# Patient Record
Sex: MALE | Race: WHITE
[De-identification: names, ages, dates, MRNs, and addresses within clinical notes are randomized per-mention and may not be internally consistent; named-entity substitution may affect disease eponyms.]

---

## 2017-08-07 ENCOUNTER — APPOINTMENT (OUTPATIENT)
Dept: DERMATOLOGY | Facility: CLINIC | Age: 22
End: 2017-08-07
Payer: COMMERCIAL

## 2017-08-07 PROCEDURE — 99202 OFFICE O/P NEW SF 15 MIN: CPT

## 2018-09-08 ENCOUNTER — EMERGENCY (EMERGENCY)
Facility: HOSPITAL | Age: 23
LOS: 0 days | Discharge: HOME | End: 2018-09-08
Attending: EMERGENCY MEDICINE
Payer: COMMERCIAL

## 2018-09-08 VITALS
HEART RATE: 81 BPM | TEMPERATURE: 98 F | DIASTOLIC BLOOD PRESSURE: 66 MMHG | OXYGEN SATURATION: 100 % | SYSTOLIC BLOOD PRESSURE: 115 MMHG | RESPIRATION RATE: 18 BRPM

## 2018-09-08 VITALS — WEIGHT: 154.98 LBS | HEIGHT: 64 IN

## 2018-09-08 DIAGNOSIS — R10.9 UNSPECIFIED ABDOMINAL PAIN: ICD-10-CM

## 2018-09-08 DIAGNOSIS — K59.00 CONSTIPATION, UNSPECIFIED: ICD-10-CM

## 2018-09-08 DIAGNOSIS — R19.7 DIARRHEA, UNSPECIFIED: ICD-10-CM

## 2018-09-08 DIAGNOSIS — R19.8 OTHER SPECIFIED SYMPTOMS AND SIGNS INVOLVING THE DIGESTIVE SYSTEM AND ABDOMEN: ICD-10-CM

## 2018-09-08 PROCEDURE — 99283 EMERGENCY DEPT VISIT LOW MDM: CPT

## 2018-09-08 NOTE — ED STATDOCS - PROGRESS NOTE DETAILS
24 yo female with no 22 yo male with no significant PMH and a FH of diverticulitis and UC presents with abd cramps x 1 month with worsening BM, constipated but very small loose stools x 2 weeks. Denies f/c/sweating, cough, cp, sob, v/bloody stools, black stools. Pt with h/o of IBS/UC. Will contact Gi for close f/u. -Manny Carcamo PA-C Dr. Morrow called back and spoke with Dr. Mcpherson and states the pt can call the office Monday and start a low residue diet. Pt agrees with plan and will f/u . -Manny Carcamo PA-C

## 2018-09-08 NOTE — ED ADULT NURSE NOTE - OBJECTIVE STATEMENT
pt c/o generalized abdominal pain on and off for about 1 month. states that he has been having an increase in frequency of bowel movements, about 6-7 a day, denies diarrhea. feels like "something is blocked and I can't fully empty". pt reports episodes of sudden acute abdominal pain and urge to defecate making him run to the bathroom. denies nausea/vomiting.

## 2018-09-08 NOTE — ED STATDOCS - CARE PLAN
Principal Discharge DX:	Abdominal cramps  Secondary Diagnosis:	Constipation, unspecified constipation type

## 2018-09-08 NOTE — ED STATDOCS - NEUROLOGICAL, MLM
Alert and oriented, no focal deficits, no motor or sensory deficits. MAE4. In no acute distress. Cranial nerves II-XII grossly intact. No dysmetria.

## 2018-09-08 NOTE — ED STATDOCS - OBJECTIVE STATEMENT
24 y/o M with no pertinent PMHx presenting to the ED c/o abdominal pain and bowel urgency x1 month. For the last month, pt has felt urgency with BMs, experiencing 7-8 small loose stools per day. +tenesmus: states that he feels as though he cannot have a complete BM. Sx worsened over the last week so pt came into ED for evaluation and concern for bowel blockage. Denies any fevers, chills, hematuria, dysuria, testicular pain, black stools, HA, CP, SOB, or cough. FHx of ulcerative colitis. Mother's GI is Dr. Vitor Holland. Allergic to Lamictal.

## 2018-09-08 NOTE — ED ADULT NURSE NOTE - NSIMPLEMENTINTERV_GEN_ALL_ED
Implemented All Universal Safety Interventions:  [place] to call system. Call bell, personal items and telephone within reach. Instruct patient to call for assistance. Room bathroom lighting operational. Non-slip footwear when patient is off stretcher. Physically safe environment: no spills, clutter or unnecessary equipment. Stretcher in lowest position, wheels locked, appropriate side rails in place.

## 2018-09-08 NOTE — ED STATDOCS - ATTENDING CONTRIBUTION TO CARE
I, Ashtyn Mcpherson MD, personally saw the patient with ACP.  I have personally performed a face to face diagnostic evaluation on this patient.  I have reviewed the ACP note and agree with the history, exam, and plan of care, except as noted.

## 2018-09-08 NOTE — ED ADULT NURSE NOTE - NS PRO PASSIVE SMOKE EXP
Continued Stay Note  SUZY Aguillon     Patient Name: Kellie Arzate  MRN: [id number]  Today's Date: 7/13/2018    Admit Date: 7/2/2018          Discharge Plan     Row Name 07/13/18 1639       Plan    Plan Home with home health possibly    Patient/Family in Agreement with Plan yes    Plan Comments Pt plans to return home or with family at d/c. Noted that he may d/c today. Informed Dr. Oliver that pt would like home health.              Discharge Codes    No documentation.           MATTHIEU Price     No

## 2018-09-08 NOTE — ED STATDOCS - MEDICAL DECISION MAKING DETAILS
22 y/o M with FHx of UC presents with 1 month of urgency, tenesmus, abdominal cramping, increased BMs and gas. Will consult GI and have pt follow up next week. Pt non-tender on abdominal exam. No need for CT at this time.

## 2019-02-21 ENCOUNTER — INPATIENT (INPATIENT)
Facility: HOSPITAL | Age: 24
LOS: 4 days | Discharge: HOME | End: 2019-02-26
Attending: PSYCHIATRY & NEUROLOGY | Admitting: PSYCHIATRY & NEUROLOGY
Payer: COMMERCIAL

## 2019-02-21 VITALS
TEMPERATURE: 99 F | WEIGHT: 149.91 LBS | DIASTOLIC BLOOD PRESSURE: 68 MMHG | RESPIRATION RATE: 16 BRPM | HEART RATE: 112 BPM | SYSTOLIC BLOOD PRESSURE: 133 MMHG | HEIGHT: 64 IN | OXYGEN SATURATION: 97 %

## 2019-02-21 DIAGNOSIS — F19.94 OTHER PSYCHOACTIVE SUBSTANCE USE, UNSPECIFIED WITH PSYCHOACTIVE SUBSTANCE-INDUCED MOOD DISORDER: ICD-10-CM

## 2019-02-21 DIAGNOSIS — Z90.89 ACQUIRED ABSENCE OF OTHER ORGANS: Chronic | ICD-10-CM

## 2019-02-21 LAB
ALBUMIN SERPL ELPH-MCNC: 4.3 G/DL — SIGNIFICANT CHANGE UP (ref 3.3–5)
ALP SERPL-CCNC: 55 U/L — SIGNIFICANT CHANGE UP (ref 40–120)
ALT FLD-CCNC: 38 U/L — SIGNIFICANT CHANGE UP (ref 12–78)
AMPHET UR-MCNC: NEGATIVE — SIGNIFICANT CHANGE UP
ANION GAP SERPL CALC-SCNC: 6 MMOL/L — SIGNIFICANT CHANGE UP (ref 5–17)
APAP SERPL-MCNC: < 2 UG/ML (ref 10–30)
APPEARANCE UR: CLEAR — SIGNIFICANT CHANGE UP
AST SERPL-CCNC: 49 U/L — HIGH (ref 15–37)
BARBITURATES UR SCN-MCNC: NEGATIVE — SIGNIFICANT CHANGE UP
BASOPHILS # BLD AUTO: 0.06 K/UL — SIGNIFICANT CHANGE UP (ref 0–0.2)
BASOPHILS NFR BLD AUTO: 0.7 % — SIGNIFICANT CHANGE UP (ref 0–2)
BENZODIAZ UR-MCNC: POSITIVE — SIGNIFICANT CHANGE UP
BILIRUB SERPL-MCNC: 2.3 MG/DL — HIGH (ref 0.2–1.2)
BILIRUB UR-MCNC: NEGATIVE — SIGNIFICANT CHANGE UP
BUN SERPL-MCNC: 17 MG/DL — SIGNIFICANT CHANGE UP (ref 7–23)
CALCIUM SERPL-MCNC: 9.5 MG/DL — SIGNIFICANT CHANGE UP (ref 8.5–10.1)
CHLORIDE SERPL-SCNC: 108 MMOL/L — SIGNIFICANT CHANGE UP (ref 96–108)
CHOLEST SERPL-MCNC: 124 MG/DL — SIGNIFICANT CHANGE UP (ref 10–199)
CO2 SERPL-SCNC: 27 MMOL/L — SIGNIFICANT CHANGE UP (ref 22–31)
COCAINE METAB.OTHER UR-MCNC: NEGATIVE — SIGNIFICANT CHANGE UP
COLOR SPEC: YELLOW — SIGNIFICANT CHANGE UP
CREAT SERPL-MCNC: 1.3 MG/DL — SIGNIFICANT CHANGE UP (ref 0.5–1.3)
DIFF PNL FLD: NEGATIVE — SIGNIFICANT CHANGE UP
EOSINOPHIL # BLD AUTO: 0.12 K/UL — SIGNIFICANT CHANGE UP (ref 0–0.5)
EOSINOPHIL NFR BLD AUTO: 1.5 % — SIGNIFICANT CHANGE UP (ref 0–6)
ESTIMATED AVERAGE GLUCOSE: 105 MG/DL — SIGNIFICANT CHANGE UP (ref 68–114)
ETHANOL SERPL-MCNC: <10 MG/DL — SIGNIFICANT CHANGE UP (ref 0–10)
GLUCOSE SERPL-MCNC: 84 MG/DL — SIGNIFICANT CHANGE UP (ref 70–99)
GLUCOSE UR QL: NEGATIVE MG/DL — SIGNIFICANT CHANGE UP
HBA1C BLD-MCNC: 5.3 % — SIGNIFICANT CHANGE UP (ref 4–5.6)
HCT VFR BLD CALC: 47.4 % — SIGNIFICANT CHANGE UP (ref 39–50)
HDLC SERPL-MCNC: 33 MG/DL — LOW
HGB BLD-MCNC: 16.1 G/DL — SIGNIFICANT CHANGE UP (ref 13–17)
IMM GRANULOCYTES NFR BLD AUTO: 0.4 % — SIGNIFICANT CHANGE UP (ref 0–1.5)
KETONES UR-MCNC: NEGATIVE — SIGNIFICANT CHANGE UP
LEUKOCYTE ESTERASE UR-ACNC: NEGATIVE — SIGNIFICANT CHANGE UP
LIPID PNL WITH DIRECT LDL SERPL: 76 MG/DL — SIGNIFICANT CHANGE UP
LYMPHOCYTES # BLD AUTO: 2.38 K/UL — SIGNIFICANT CHANGE UP (ref 1–3.3)
LYMPHOCYTES # BLD AUTO: 29.2 % — SIGNIFICANT CHANGE UP (ref 13–44)
MCHC RBC-ENTMCNC: 29.2 PG — SIGNIFICANT CHANGE UP (ref 27–34)
MCHC RBC-ENTMCNC: 34 GM/DL — SIGNIFICANT CHANGE UP (ref 32–36)
MCV RBC AUTO: 85.9 FL — SIGNIFICANT CHANGE UP (ref 80–100)
METHADONE UR-MCNC: NEGATIVE — SIGNIFICANT CHANGE UP
MONOCYTES # BLD AUTO: 0.76 K/UL — SIGNIFICANT CHANGE UP (ref 0–0.9)
MONOCYTES NFR BLD AUTO: 9.3 % — SIGNIFICANT CHANGE UP (ref 2–14)
NEUTROPHILS # BLD AUTO: 4.79 K/UL — SIGNIFICANT CHANGE UP (ref 1.8–7.4)
NEUTROPHILS NFR BLD AUTO: 58.9 % — SIGNIFICANT CHANGE UP (ref 43–77)
NITRITE UR-MCNC: NEGATIVE — SIGNIFICANT CHANGE UP
NRBC # BLD: 0 /100 WBCS — SIGNIFICANT CHANGE UP (ref 0–0)
OPIATES UR-MCNC: NEGATIVE — SIGNIFICANT CHANGE UP
PCP SPEC-MCNC: SIGNIFICANT CHANGE UP
PCP UR-MCNC: NEGATIVE — SIGNIFICANT CHANGE UP
PH UR: 7 — SIGNIFICANT CHANGE UP (ref 5–8)
PLATELET # BLD AUTO: 192 K/UL — SIGNIFICANT CHANGE UP (ref 150–400)
POTASSIUM SERPL-MCNC: 3.7 MMOL/L — SIGNIFICANT CHANGE UP (ref 3.5–5.3)
POTASSIUM SERPL-SCNC: 3.7 MMOL/L — SIGNIFICANT CHANGE UP (ref 3.5–5.3)
PROT SERPL-MCNC: 7.8 GM/DL — SIGNIFICANT CHANGE UP (ref 6–8.3)
PROT UR-MCNC: NEGATIVE MG/DL — SIGNIFICANT CHANGE UP
RBC # BLD: 5.52 M/UL — SIGNIFICANT CHANGE UP (ref 4.2–5.8)
RBC # FLD: 12.2 % — SIGNIFICANT CHANGE UP (ref 10.3–14.5)
SALICYLATES SERPL-MCNC: <1.7 MG/DL (ref 2.8–20)
SODIUM SERPL-SCNC: 141 MMOL/L — SIGNIFICANT CHANGE UP (ref 135–145)
SP GR SPEC: 1.02 — SIGNIFICANT CHANGE UP (ref 1.01–1.02)
THC UR QL: NEGATIVE — SIGNIFICANT CHANGE UP
TOTAL CHOLESTEROL/HDL RATIO MEASUREMENT: 3.8 RATIO — SIGNIFICANT CHANGE UP (ref 3.4–9.6)
TRIGL SERPL-MCNC: 76 MG/DL — SIGNIFICANT CHANGE UP (ref 10–149)
TSH SERPL-MCNC: 0.76 UU/ML — SIGNIFICANT CHANGE UP (ref 0.34–4.82)
UROBILINOGEN FLD QL: NEGATIVE MG/DL — SIGNIFICANT CHANGE UP
WBC # BLD: 8.14 K/UL — SIGNIFICANT CHANGE UP (ref 3.8–10.5)
WBC # FLD AUTO: 8.14 K/UL — SIGNIFICANT CHANGE UP (ref 3.8–10.5)

## 2019-02-21 PROCEDURE — 99222 1ST HOSP IP/OBS MODERATE 55: CPT

## 2019-02-21 PROCEDURE — 93010 ELECTROCARDIOGRAM REPORT: CPT

## 2019-02-21 PROCEDURE — 99285 EMERGENCY DEPT VISIT HI MDM: CPT

## 2019-02-21 RX ORDER — QUETIAPINE FUMARATE 200 MG/1
25 TABLET, FILM COATED ORAL AT BEDTIME
Qty: 0 | Refills: 0 | Status: DISCONTINUED | OUTPATIENT
Start: 2019-02-21 | End: 2019-02-22

## 2019-02-21 RX ORDER — ESCITALOPRAM OXALATE 10 MG/1
30 TABLET, FILM COATED ORAL DAILY
Qty: 0 | Refills: 0 | Status: DISCONTINUED | OUTPATIENT
Start: 2019-02-21 | End: 2019-02-26

## 2019-02-21 RX ORDER — ACETAMINOPHEN 500 MG
650 TABLET ORAL EVERY 6 HOURS
Qty: 0 | Refills: 0 | Status: DISCONTINUED | OUTPATIENT
Start: 2019-02-21 | End: 2019-02-26

## 2019-02-21 RX ORDER — MAGNESIUM HYDROXIDE 400 MG/1
30 TABLET, CHEWABLE ORAL DAILY
Qty: 0 | Refills: 0 | Status: DISCONTINUED | OUTPATIENT
Start: 2019-02-21 | End: 2019-02-26

## 2019-02-21 RX ORDER — OLANZAPINE 15 MG/1
5 TABLET, FILM COATED ORAL EVERY 8 HOURS
Qty: 0 | Refills: 0 | Status: DISCONTINUED | OUTPATIENT
Start: 2019-02-21 | End: 2019-02-26

## 2019-02-21 RX ADMIN — QUETIAPINE FUMARATE 25 MILLIGRAM(S): 200 TABLET, FILM COATED ORAL at 20:47

## 2019-02-21 RX ADMIN — ESCITALOPRAM OXALATE 30 MILLIGRAM(S): 10 TABLET, FILM COATED ORAL at 11:11

## 2019-02-21 NOTE — H&P ADULT - ASSESSMENT
22 y/o male with history of Depression and Benzo use disorder presented to ED with depression and suicidal ideation.    # Major depression with suicidal ideation  -Primary management per psych    # Benzo use disorder  -Monitor for signs of withdrawal  -Encourage cessation    DVT ppx  Encourage ambulation

## 2019-02-21 NOTE — ED ADULT NURSE NOTE - OBJECTIVE STATEMENT
Pt biba [id number] for SI. In ED, pt denies SI/HI. Pt states parents called 911 after patient took 2 2 mg xanax at approx 4pm today. As per patient, he was once prescribed xanax but is not currently prescribed. Pt is lethargic but arousable with verbal stimuli. Cooperative with staff. Belonings with security. Constant observation in progress, safety maintained Pt biba [id number] for SI. In ED, pt denies SI/HI. Pt states parents called 911 after patient took 2 2 mg xanax at approx 4pm today. As per patient, he was once prescribed xanax but is not currently prescribed. Pt is lethargic but arousable with verbal stimuli. Cooperative with staff. Belongings with security. As per EMS, pt's parents stated "Today is going to be the last day of my life" Constant observation in progress, safety maintained Pt edina [id number] for SI. In ED, pt denies SI/HI. Pt states parents called 911 after patient took 2 2 mg xanax at approx 4pm today. As per patient, he was once prescribed xanax but is not currently prescribed. Pt is lethargic but arousable with verbal stimuli. Cooperative with staff. Belongings with security. As per EMS, pt's parents stated that patient said "Today is going to be the last day of my life" Constant observation in progress, safety maintained

## 2019-02-21 NOTE — ED BEHAVIORAL HEALTH ASSESSMENT NOTE - SUMMARY
23 year old  male, domiciled with parents, no formal medical hx, past psych hx of depression/anxiety on lexapro and seroquel, hx of xanax abuse with recent relapse, hx of SI in past without prior suicide attempt, 1 prior admission 3 years ago, no legal charges, brought in by EMS activated by mother after patient made questionable suicide attempt with xanax; reported "this is my last day on earth" to mother.    Patient's mood and safety appear to be declining in context of comorbid anxiety and xanax abuse. patient symptoms worsening with concern for nearly committed suicide tonight as by report of patient. Would benefit from safety and stabilization on unit to treat both depression/anxiety and comorbid substance abuse to help mitigate risk factors.

## 2019-02-21 NOTE — BEHAVIORAL HEALTH ASSESSMENT NOTE - NSBHCHARTREVIEWLAB_PSY_A_CORE FT
16.1   8.14  )-----------( 192      ( 21 Feb 2019 00:27 )             47.4     02-21    141  |  108  |  17  ----------------------------<  84  3.7   |  27  |  1.30    Ca    9.5      21 Feb 2019 00:27    TPro  7.8  /  Alb  4.3  /  TBili  2.3<H>  /  DBili  x   /  AST  49<H>  /  ALT  38  /  AlkPhos  55  02-21

## 2019-02-21 NOTE — ED BEHAVIORAL HEALTH ASSESSMENT NOTE - SUBSTANCE ISSUES AND PLAN (INCLUDE STANDING AND PRN MEDICATION)
given hx of benzo abuse/use, will order CIWA q 4 hours, vitals q 4 hours, Symptom-triggered Ativan 2mg q 4 hours for CIWA score over 8. Provide Folic Acid 1mg qd, Multivitamin with minerals qd, Thiamine 1 tablet qd x 3 days.

## 2019-02-21 NOTE — ED BEHAVIORAL HEALTH ASSESSMENT NOTE - DESCRIPTION
denies lives with parents; obtained associate's degree and looking for job Per ED RN and documentation patient arrived alert and oriented x3 but lethargic from Xanax use prior to arrival reportedly 2 2mg tablets at 1600 and told triage he planned on taking more but they went missing, he has normal grooming and hygiene, he is cooperative with ED medical and safety protocols with no items of note in property. Patient reports normal mood his affect appears congruent, he denies current suicidal or homicidal thoughts in ED, he does not report or exhibit symptoms of psychosis or santos, patient has linear thought process, normal speech, no other reported MSE positives by staff. Patient has been observed to sleep while in the ED, he did not have visitors to bedside. Patient remained in good behavioral control while in the ED, did not require PRN psychiatric medication. No other issues reported.

## 2019-02-21 NOTE — ED ADULT NURSE REASSESSMENT NOTE - NS ED NURSE REASSESS COMMENT FT1
pt has been re-wanded by security, form placed in chart. 9.39 completed and faxed. originals sent in chart. report given to Ayla DENNIS. pt sent to N with transport and EDT. belongings locked with security.

## 2019-02-21 NOTE — ED BEHAVIORAL HEALTH ASSESSMENT NOTE - DETAILS
hx of SI in past; tonight unclear if ingestion was for suicide attempt depression/anxiety in family; no known suicide attempt 5N nurse Sujey received handoff spoke with mother

## 2019-02-21 NOTE — ED ADULT NURSE REASSESSMENT NOTE - NS ED NURSE REASSESS COMMENT FT1
pt vitals taken at this time, blood pressure checked twice. /52(64), hypotensive. MD Frost notified, pt offered food and drink. tolerating well. vital signs as charted, will continue to monitor.

## 2019-02-21 NOTE — ED ADULT TRIAGE NOTE - CHIEF COMPLAINT QUOTE
Brought in from home by [facility] for SI. As per [facility], patient made statements to parents today that today will be his last day on earth. Then he took 2 2mg tablets of Xanax at 1600 to end his life, was planning on taking more but states they "went missing" Calm and cooperative at triage. VS stable. No distress noted.

## 2019-02-21 NOTE — ED PROVIDER NOTE - OBJECTIVE STATEMENT
24 yo male with ho depression and anxiety and benzo abuse bib [facility] from home after alluding to this being his last day on earth.
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
## 2019-02-21 NOTE — H&P ADULT - NSHPSOCIALHISTORY_GEN_ALL_CORE
Lives at home with family, unemployed. History of xanax abuse. Denies tobacco use. Social alcohol use
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
## 2019-02-21 NOTE — BEHAVIORAL HEALTH ASSESSMENT NOTE - NSBHCHARTREVIEWVS_PSY_A_CORE FT
Vital Signs Last 24 Hrs  T(C): 36.8 (21 Feb 2019 06:48), Max: 37.2 (21 Feb 2019 00:06)  T(F): 98.3 (21 Feb 2019 06:48), Max: 98.9 (21 Feb 2019 00:06)  HR: 70 (21 Feb 2019 05:23) (70 - 112)  BP: 103/50 (21 Feb 2019 05:23) (102/52 - 133/68)  BP(mean): 60 (21 Feb 2019 05:23) (60 - 64)  RR: 17 (21 Feb 2019 05:23) (16 - 18)  SpO2: 100% (21 Feb 2019 05:23) (97% - 100%)
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
## 2019-02-21 NOTE — CHART NOTE - NSCHARTNOTEFT_GEN_A_CORE
Called and spoke with patient's mother to obtain additional history.  (See social work assessment)  Mother aware that patient only minimally meets criteria to be on the unit and we anticipate discharge by Monday if patient does not have withdrawal symptoms, is sleeping well, denying suicidal ideation, intent or plan.  Mother agreed that patient can return home.

## 2019-02-21 NOTE — ED BEHAVIORAL HEALTH ASSESSMENT NOTE - DESCRIPTION (FIRST USE, LAST USE, QUANTITY, FREQUENCY, DURATION)
hx of use, denies recent use hx of abuse, xanax with arrests, treatment; relapsed back on abuse of xanax, buying on the street

## 2019-02-21 NOTE — BEHAVIORAL HEALTH ASSESSMENT NOTE - NS ED BHA AXIS I PRIMARY CODE FT
1. Have you been to the ER, urgent care clinic since your last visit? Hospitalized since your last visit? No     2. Have you seen or consulted any other health care providers outside of the [address] [place] [address] since your last visit? Include any pap smears or colon screening.  Slater F19.94

## 2019-02-21 NOTE — ED ADULT NURSE NOTE - NSIMPLEMENTINTERV_GEN_ALL_ED
Implemented All Universal Safety Interventions:  [place] to call system. Call bell, personal items and telephone within reach. Instruct patient to call for assistance. Room bathroom lighting operational. Non-slip footwear when patient is off stretcher. Physically safe environment: no spills, clutter or unnecessary equipment. Stretcher in lowest position, wheels locked, appropriate side rails in place.

## 2019-02-21 NOTE — ED ADULT NURSE REASSESSMENT NOTE - NS ED NURSE REASSESS COMMENT FT1
pt has been seen by telepsych and is agreeable to voluntary admission. MD Frost and pt has read and completed necessary 9.13 forms and have been faxed over. awaiting bed. will continue to monitor.

## 2019-02-22 DIAGNOSIS — F33.1 MAJOR DEPRESSIVE DISORDER, RECURRENT, MODERATE: ICD-10-CM

## 2019-02-22 PROCEDURE — 99232 SBSQ HOSP IP/OBS MODERATE 35: CPT

## 2019-02-22 RX ORDER — QUETIAPINE FUMARATE 200 MG/1
50 TABLET, FILM COATED ORAL AT BEDTIME
Qty: 0 | Refills: 0 | Status: DISCONTINUED | OUTPATIENT
Start: 2019-02-22 | End: 2019-02-26

## 2019-02-22 RX ADMIN — QUETIAPINE FUMARATE 50 MILLIGRAM(S): 200 TABLET, FILM COATED ORAL at 20:44

## 2019-02-22 RX ADMIN — ESCITALOPRAM OXALATE 30 MILLIGRAM(S): 10 TABLET, FILM COATED ORAL at 10:39

## 2019-02-23 PROCEDURE — 99232 SBSQ HOSP IP/OBS MODERATE 35: CPT

## 2019-02-23 RX ADMIN — QUETIAPINE FUMARATE 50 MILLIGRAM(S): 200 TABLET, FILM COATED ORAL at 21:20

## 2019-02-23 RX ADMIN — ESCITALOPRAM OXALATE 30 MILLIGRAM(S): 10 TABLET, FILM COATED ORAL at 09:20

## 2019-02-24 PROCEDURE — 99232 SBSQ HOSP IP/OBS MODERATE 35: CPT

## 2019-02-24 RX ADMIN — QUETIAPINE FUMARATE 50 MILLIGRAM(S): 200 TABLET, FILM COATED ORAL at 22:02

## 2019-02-24 RX ADMIN — ESCITALOPRAM OXALATE 30 MILLIGRAM(S): 10 TABLET, FILM COATED ORAL at 08:59

## 2019-02-25 PROCEDURE — 99231 SBSQ HOSP IP/OBS SF/LOW 25: CPT

## 2019-02-25 RX ADMIN — ESCITALOPRAM OXALATE 30 MILLIGRAM(S): 10 TABLET, FILM COATED ORAL at 09:04

## 2019-02-25 RX ADMIN — QUETIAPINE FUMARATE 50 MILLIGRAM(S): 200 TABLET, FILM COATED ORAL at 20:41

## 2019-02-25 NOTE — DISCHARGE NOTE BEHAVIORAL HEALTH - FAMILY HISTORY OF PSYCHIATRIC ILLNESS
Patient denies family history of mental illness.  Patient is single, lives with his parents and has one older sister who lives in [place].
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
## 2019-02-25 NOTE — DISCHARGE NOTE BEHAVIORAL HEALTH - MEDICATION SUMMARY - MEDICATIONS TO TAKE
I will START or STAY ON the medications listed below when I get home from the hospital:    escitalopram 10 mg oral tablet  -- 3 tab(s) by mouth once a day  -- Indication: For Depression    SEROquel 50 mg oral tablet  -- 1 tab(s) by mouth once a day (at bedtime)  -- Indication: For Substance induced mood disorder

## 2019-02-25 NOTE — DISCHARGE NOTE BEHAVIORAL HEALTH - NSBHDCALCOHOLREFERFT_PSY_A_CORE
Pt [facility] substance [facility]  Contact info and appt as above.

## 2019-02-25 NOTE — DISCHARGE NOTE BEHAVIORAL HEALTH - NSBHDCCRISISPLAN2FT_PSY_A_CORE
Call Dr. Gleason at [facility] at [phone]  Call Dr. Blankenship at [phone]

## 2019-02-25 NOTE — DISCHARGE NOTE BEHAVIORAL HEALTH - NSBHDCCRISISPLAN1FT_PSY_A_CORE
Talk to a trusted family member, friend, counselor and ask for help  Call the Suicide Hotline at [phone]  Call 911 or go to my nearest hospital emergency room and ask for help

## 2019-02-25 NOTE — DISCHARGE NOTE BEHAVIORAL HEALTH - NSBHDCSUBSTHXFT_PSY_A_CORE
Patient reports buying xanax off the street once every month or two.  He states he uses it when his anxiety really becomes too much to deal with.

## 2019-02-25 NOTE — DISCHARGE NOTE BEHAVIORAL HEALTH - NSBHDCTHERAPYFT_PSY_A_CORE
Individual, group, medication management, safety planning with 15 min safety checks, discharge planning with family involvement as needed.

## 2019-02-25 NOTE — DISCHARGE NOTE BEHAVIORAL HEALTH - NSBHDCMEDSFT_PSY_A_CORE
Lexapro 30mg daily  Seroquel 50 mg at HS    Patient with good response to meds Lexapro 30mg daily  Seroquel 50 mg at HS    Patient with good response to meds    PATIENT EDUCATED TO NOT STOP ANY MEDICATIONS UNLESS OTHERWISE TOLD TO DO SO BY OUTPATIENT PROVIDER

## 2019-02-25 NOTE — DISCHARGE NOTE BEHAVIORAL HEALTH - NSBHDCSWCOMMENTSFT_PSY_A_CORE
Pt educated about the signs and symptoms of depression, anxiety and substance misuse, as well as the need and resources for continuing in the appropriate level of outpatient treatment. Pt also educated to continue taking the above medication until directed otherwise by his outpatient provider.

## 2019-02-25 NOTE — CHART NOTE - NSCHARTNOTEFT_GEN_A_CORE
Spoke with pt's father this AM about discharge planning. Pt is already in treatment with Psychiatrist, Dr. Blankenship but educated pt's father about MD rec for an individual therapist for substance abuse counseling and psychotherapy. He supports this recommendation. He doesn't have anyone in mind and is agreeable with hospital finding a therapist. Called Dr. Blankenship's office and was able to schedule appt for pt to see Dr. Blankenship on Wed. 2/27/19 at 4:30PM. Also called insurance company to find in-network providers that do psychotherapy and substance abuse counseling. Was able to schedule an appt with therapist, Khalif Jimenes LCSW on Thur. 2/28/2019 at 6:45PM. Pt advised he must call to confirm this appt.

## 2019-02-25 NOTE — DISCHARGE NOTE BEHAVIORAL HEALTH - NSBHDCVIOLSAFETYFT_PSY_A_CORE
Advised to return to hospital or go to nearest ED or call 911 or [phone] [phone] or [phone] TALK hotlines for any severe, worsening or persistent symptoms including suicidal/homicidal ideations, intent or plans. Patient verbalized understanding of instructions.

## 2019-02-25 NOTE — DISCHARGE NOTE BEHAVIORAL HEALTH - NSBHDCTESTSFT_PSY_A_CORE
HA1C = 5.3  Thyroid = 0.76  Lipid Panel -  Cholesterol = 124  Triglycerides = 76  HDL = 33  LDL = 76
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
## 2019-02-25 NOTE — DISCHARGE NOTE BEHAVIORAL HEALTH - NSBHDCSUICSAFETYFT_PSY_A_CORE
Advised to return to hospital or go to nearest ED or call 911 or [phone] LIFENET or [phone] TALK hotlines for any severe, worsening or persistent symptoms including suicidal/homicidal ideations, intent or plans. Patient verbalized understanding of instructions.

## 2019-02-25 NOTE — DISCHARGE NOTE BEHAVIORAL HEALTH - NSBHDCVIOLFCTRMIT_PSY_A_CORE
Patient willing to attend substance abuse treatment, supportive family, stable living arrangements, employed, intelligent, compliant with meds and treatment.

## 2019-02-25 NOTE — DISCHARGE NOTE BEHAVIORAL HEALTH - NSBHDCSUICFCTRSFT_PSY_A_CORE
Patient's mood and safety appear to be declining in context of comorbid anxiety and xanax abuse. patient symptoms worsening with concern for nearly committed suicide tonight as by report of patient. Would benefit from safety and stabilization on unit to treat both depression/anxiety and comorbid substance abuse to help mitigate risk factors.

## 2019-02-26 VITALS — RESPIRATION RATE: 14 BRPM | TEMPERATURE: 98 F | OXYGEN SATURATION: 99 %

## 2019-02-26 PROCEDURE — 99232 SBSQ HOSP IP/OBS MODERATE 35: CPT

## 2019-02-26 RX ORDER — ESCITALOPRAM OXALATE 10 MG/1
3 TABLET, FILM COATED ORAL
Qty: 45 | Refills: 0 | OUTPATIENT
Start: 2019-02-26 | End: 2019-03-12

## 2019-02-26 RX ORDER — ESCITALOPRAM OXALATE 10 MG/1
30 TABLET, FILM COATED ORAL
Qty: 0 | Refills: 0 | COMMUNITY

## 2019-02-26 RX ORDER — ESCITALOPRAM OXALATE 10 MG/1
3 TABLET, FILM COATED ORAL
Qty: 0 | Refills: 0 | COMMUNITY
Start: 2019-02-26

## 2019-02-26 RX ORDER — QUETIAPINE FUMARATE 200 MG/1
1 TABLET, FILM COATED ORAL
Qty: 15 | Refills: 0 | OUTPATIENT
Start: 2019-02-26 | End: 2019-03-12

## 2019-02-26 RX ORDER — QUETIAPINE FUMARATE 200 MG/1
1 TABLET, FILM COATED ORAL
Qty: 0 | Refills: 0 | COMMUNITY
Start: 2019-02-26

## 2019-02-26 RX ORDER — QUETIAPINE FUMARATE 200 MG/1
1 TABLET, FILM COATED ORAL
Qty: 0 | Refills: 0 | COMMUNITY

## 2019-02-26 RX ADMIN — ESCITALOPRAM OXALATE 30 MILLIGRAM(S): 10 TABLET, FILM COATED ORAL at 09:06

## 2019-02-26 NOTE — PROGRESS NOTE BEHAVIORAL HEALTH - RISK ASSESSMENT
higher imminent risk to self and others given active SI with ingestion and comorbid substance use
higher imminent risk to self and others given active SI with ingestion and comorbid substance use
Suicide risk reduced,. no SI, no impulsive behavior, in remission not depressed.
higher imminent risk to self and others given active SI with ingestion and comorbid substance use
higher imminent risk to self and others given active SI with ingestion and comorbid substance use

## 2019-02-26 NOTE — PROGRESS NOTE BEHAVIORAL HEALTH - SUMMARY
23 year old  male, domiciled with parents, no formal medical hx, past psych hx of depression/anxiety on lexapro and seroquel, hx of xanax abuse with recent relapse, hx of SI in past without prior suicide attempt, 1 prior admission 3 years ago, no legal charges, brought in by EMS activated by mother after patient made questionable suicide attempt with xanax; reported "this is my last day on earth" to mother.
23 year old  male, domiciled with parents, no formal medical hx, past psych hx of depression/anxiety on lexapro and seroquel, hx of xanax abuse with recent relapse, hx of SI in past without prior suicide attempt, 1 prior admission 3 years ago, no legal charges, brought in by EMS activated by mother after patient made questionable suicide attempt with xanax; reported "this is my last day on earth" to mother.
23 year old  male, domiciled with parents, no formal medical hx, past psych hx of depression/anxiety on lexapro and seroquel, hx of xanax abuse with recent relapse, hx of SI in past without prior suicide attempt, 1 prior admission 3 years ago, no legal charges, brought in by EMS activated by mother after patient made questionable suicide attempt with xanax; reported "this is my last day on earth" to mother.    Denies suicidal ideation. Denies depressed mood. Denies hopelessness. Reports feeling ready for discharge today
23 year old  male, domiciled with parents, no formal medical hx, past psych hx of depression/anxiety on lexapro and seroquel, hx of xanax abuse with recent relapse, hx of SI in past without prior suicide attempt, 1 prior admission 3 years ago, no legal charges, brought in by EMS activated by mother after patient made questionable suicide attempt with xanax; reported "this is my last day on earth" to mother.    Denies suicidal ideation. Denies depressed mood. Denies hopelessness. Reports feeling ready for discharge.
23 year old  male, domiciled with parents, no formal medical hx, past psych hx of depression/anxiety on lexapro and seroquel, hx of xanax abuse with recent relapse, hx of SI in past without prior suicide attempt, 1 prior admission 3 years ago, no legal charges, brought in by EMS activated by mother after patient made questionable suicide attempt with xanax; reported "this is my last day on earth" to mother.    Patient's mood and safety appear to be declining in context of comorbid anxiety and xanax abuse. patient symptoms worsening with concern for nearly committed suicide tonight as by report of patient. Would benefit from safety and stabilization on unit to treat both depression/anxiety and comorbid substance abuse to help mitigate risk factors.    PT needs inpatient treatment.

## 2019-02-26 NOTE — PROGRESS NOTE BEHAVIORAL HEALTH - ORIENTATION OTHER
stated it is december 2019 , and [facility] , and [facility] 2019 , and [facility] , [facility]

## 2019-02-26 NOTE — PROGRESS NOTE BEHAVIORAL HEALTH - NSBHFUPINTERVALCCFT_PSY_A_CORE
I am ok
Im good
Patient reports that he is feeling more clear headed and less depressed  Denies being suicidal  Says he said that because he had taken a lot of Xanax prior to admission
patient reports he did not sleep very well last night but is otherwise feeling well  Not feeling depressed and is not suicidal
I am good

## 2019-02-26 NOTE — PROGRESS NOTE BEHAVIORAL HEALTH - NSBHCHARTREVIEWVS_PSY_A_CORE FT
Vital Signs Last 24 Hrs  T(C): 36.4 (26 Feb 2019 09:01), Max: 36.4 (26 Feb 2019 09:01)  T(F): 97.5 (26 Feb 2019 09:01), Max: 97.5 (26 Feb 2019 09:01)  HR: --62  BP: -- 104/58  BP(mean): --  RR: 14 (26 Feb 2019 09:01) (14 - 14)  SpO2: 99% (26 Feb 2019 09:01) (99% - 99%)
Vital Signs Last 24 Hrs  T(C): 36.3 (22 Feb 2019 11:55), Max: 36.8 (21 Feb 2019 21:55)  T(F): 97.4 (22 Feb 2019 11:55), Max: 98.2 (21 Feb 2019 21:55)  HR: 66 (22 Feb 2019 11:55) (58 - 66)  BP: 93/41 (22 Feb 2019 06:18) (93/41 - 104/50)  BP(mean): --  RR: 14 (22 Feb 2019 11:55) (14 - 16)  SpO2: 99% (22 Feb 2019 11:55) (98% - 99%)
Vital Signs Last 24 Hrs  T(C): 36.3 (24 Feb 2019 09:09), Max: 36.7 (23 Feb 2019 20:10)  T(F): 97.4 (24 Feb 2019 09:09), Max: 98 (23 Feb 2019 20:10)  HR: 68 (24 Feb 2019 06:00) (68 - 77)  BP: 110/62 (24 Feb 2019 06:00) (103/53 - 110/62)  BP(mean): 65 (23 Feb 2019 20:10) (65 - 65)  RR: 18 (24 Feb 2019 09:09) (18 - 18)  SpO2: 98% (24 Feb 2019 09:09) (98% - 99%)Vital Signs Last 24 Hrs  T(C): 36.7 (25 Feb 2019 09:12), Max: 36.9 (24 Feb 2019 20:24)  T(F): 98 (25 Feb 2019 09:12), Max: 98.4 (24 Feb 2019 20:24)  HR: --  BP: --  BP(mean): --  RR: 14 (25 Feb 2019 09:12) (14 - 18)  SpO2: 98% (25 Feb 2019 09:12) (98% - 98%)
Vital Signs Last 24 Hrs  T(C): 36.4 (23 Feb 2019 09:21), Max: 36.4 (22 Feb 2019 16:10)  T(F): 97.5 (23 Feb 2019 09:21), Max: 97.6 (22 Feb 2019 16:10)  HR: 67 (23 Feb 2019 03:20) (59 - 67)  BP: 107/54 (22 Feb 2019 16:10) (107/54 - 107/54)  BP(mean): --  RR: 14 (23 Feb 2019 09:21) (14 - 16)  SpO2: 98% (23 Feb 2019 09:21) (98% - 100%)
Vital Signs Last 24 Hrs  T(C): 36.3 (24 Feb 2019 09:09), Max: 36.7 (23 Feb 2019 20:10)  T(F): 97.4 (24 Feb 2019 09:09), Max: 98 (23 Feb 2019 20:10)  HR: 68 (24 Feb 2019 06:00) (68 - 77)  BP: 110/62 (24 Feb 2019 06:00) (103/53 - 110/62)  BP(mean): 65 (23 Feb 2019 20:10) (65 - 65)  RR: 18 (24 Feb 2019 09:09) (18 - 18)  SpO2: 98% (24 Feb 2019 09:09) (98% - 99%)

## 2019-02-26 NOTE — PROGRESS NOTE BEHAVIORAL HEALTH - OTHER
minimizing suicidality

## 2019-02-26 NOTE — PROGRESS NOTE BEHAVIORAL HEALTH - NSBHFUPINTERVALHXFT_PSY_A_CORE
Continues to make progress     Looking forward to ultimate discharge.    MEDICATIONS  (STANDING):  escitalopram 30 milliGRAM(s) Oral daily  quetiapine 50 milliGRAM(s) Oral at bedtime    MEDICATIONS  (PRN):  acetaminophen   Tablet .. 650 milliGRAM(s) Oral every 6 hours PRN Moderate Pain (4 - 6)  aluminum hydroxide/magnesium hydroxide/simethicone Suspension 30 milliLiter(s) Oral every 4 hours PRN Dyspepsia  LORazepam     Tablet 1 milliGRAM(s) Oral every 6 hours PRN Anxiety  magnesium hydroxide Suspension 30 milliLiter(s) Oral daily PRN Constipation  OLANZapine 5 milliGRAM(s) Oral every 8 hours PRN agitation
Patient spent most of time in bed; sleeping and was social and more active later. Patient ate: appetite is good. Reports mood being stable. Denies depression or hopelessness or suicidal ideation. Reports making suicidal statement secondary to being intoxicated with Xanax. Reports future orientation, with motivation to continue outpatient treatment. Engaged in safety planning.     MEDICATIONS  (STANDING):  escitalopram 30 milliGRAM(s) Oral daily  QUEtiapine 50 milliGRAM(s) Oral at bedtime    MEDICATIONS  (PRN):  acetaminophen   Tablet .. 650 milliGRAM(s) Oral every 6 hours PRN Moderate Pain (4 - 6)  aluminum hydroxide/magnesium hydroxide/simethicone Suspension 30 milliLiter(s) Oral every 4 hours PRN Dyspepsia  LORazepam     Tablet 1 milliGRAM(s) Oral every 6 hours PRN Anxiety  magnesium hydroxide Suspension 30 milliLiter(s) Oral daily PRN Constipation  OLANZapine 5 milliGRAM(s) Oral every 8 hours PRN agitation
Pt is spending his time mostly in his bed, but also visible on the unit, sleep is good. appetite is good. Energy level is satisfying. Pt minimizes his suicidal thoughts and statements and today he denies having SI or HI. Denies ah and vh. Denies PI.   Takes his meds.   cooperative with care.   MEDICATIONS  (STANDING):  escitalopram 30 milliGRAM(s) Oral daily  QUEtiapine 50 milliGRAM(s) Oral at bedtime    MEDICATIONS  (PRN):  acetaminophen   Tablet .. 650 milliGRAM(s) Oral every 6 hours PRN Moderate Pain (4 - 6)  aluminum hydroxide/magnesium hydroxide/simethicone Suspension 30 milliLiter(s) Oral every 4 hours PRN Dyspepsia  LORazepam     Tablet 1 milliGRAM(s) Oral every 6 hours PRN Anxiety  magnesium hydroxide Suspension 30 milliLiter(s) Oral daily PRN Constipation  OLANZapine 5 milliGRAM(s) Oral every 8 hours PRN agitation
Spending most of day in bed but reporting overall improvement  Focused upon discharge    MEDICATIONS  (STANDING):  escitalopram 30 milliGRAM(s) Oral daily  QUEtiapine 50 milliGRAM(s) Oral at bedtime    MEDICATIONS  (PRN):  acetaminophen   Tablet .. 650 milliGRAM(s) Oral every 6 hours PRN Moderate Pain (4 - 6)  aluminum hydroxide/magnesium hydroxide/simethicone Suspension 30 milliLiter(s) Oral every 4 hours PRN Dyspepsia  LORazepam     Tablet 1 milliGRAM(s) Oral every 6 hours PRN Anxiety  magnesium hydroxide Suspension 30 milliLiter(s) Oral daily PRN Constipation  OLANZapine 5 milliGRAM(s) Oral every 8 hours PRN agitation
Pt is visible today, casually dressed. Appetite is good. Sleep is good. Reports mood being stable. Denies depression or hopelessness or suicidal ideation. Reports making suicidal statement secondary to being intoxicated with Xanax. Reports future orientation, with motivation to continue outpatient treatment. Engaged in safety planning.     MEDICATIONS  (STANDING):  escitalopram 30 milliGRAM(s) Oral daily  QUEtiapine 50 milliGRAM(s) Oral at bedtime    MEDICATIONS  (PRN):  acetaminophen   Tablet .. 650 milliGRAM(s) Oral every 6 hours PRN Moderate Pain (4 - 6)  aluminum hydroxide/magnesium hydroxide/simethicone Suspension 30 milliLiter(s) Oral every 4 hours PRN Dyspepsia  LORazepam     Tablet 1 milliGRAM(s) Oral every 6 hours PRN Anxiety  magnesium hydroxide Suspension 30 milliLiter(s) Oral daily PRN Constipation  OLANZapine 5 milliGRAM(s) Oral every 8 hours PRN agitation

## 2019-02-26 NOTE — PROGRESS NOTE BEHAVIORAL HEALTH - NSBHATTESTSEENBY_PSY_A_CORE
attending Psychiatrist without NP/Trainee
attending Psychiatrist without NP/Trainee
NP with telephonic supervision from Attending Psychiatrist
NP with telephonic supervision from Attending Psychiatrist
attending Psychiatrist without NP/Trainee

## 2019-03-05 DIAGNOSIS — F19.94 OTHER PSYCHOACTIVE SUBSTANCE USE, UNSPECIFIED WITH PSYCHOACTIVE SUBSTANCE-INDUCED MOOD DISORDER: ICD-10-CM

## 2019-03-05 DIAGNOSIS — Z88.8 ALLERGY STATUS TO OTHER DRUGS, MEDICAMENTS AND BIOLOGICAL SUBSTANCES: ICD-10-CM

## 2019-03-05 DIAGNOSIS — F42.9 OBSESSIVE-COMPULSIVE DISORDER, UNSPECIFIED: ICD-10-CM

## 2019-03-05 DIAGNOSIS — R45.851 SUICIDAL IDEATIONS: ICD-10-CM

## 2019-03-05 DIAGNOSIS — F13.90 SEDATIVE, HYPNOTIC, OR ANXIOLYTIC USE, UNSPECIFIED, UNCOMPLICATED: ICD-10-CM

## 2019-03-05 DIAGNOSIS — Z56.0 UNEMPLOYMENT, UNSPECIFIED: ICD-10-CM

## 2019-03-05 DIAGNOSIS — F41.9 ANXIETY DISORDER, UNSPECIFIED: ICD-10-CM

## 2019-03-05 DIAGNOSIS — F33.1 MAJOR DEPRESSIVE DISORDER, RECURRENT, MODERATE: ICD-10-CM

## 2019-03-05 SDOH — ECONOMIC STABILITY - INCOME SECURITY: UNEMPLOYMENT, UNSPECIFIED: Z56.0

## 2019-07-02 ENCOUNTER — APPOINTMENT (OUTPATIENT)
Dept: DERMATOLOGY | Facility: CLINIC | Age: 24
End: 2019-07-02

## 2019-08-07 PROBLEM — F32.9 MAJOR DEPRESSIVE DISORDER, SINGLE EPISODE, UNSPECIFIED: Chronic | Status: ACTIVE | Noted: 2019-02-21

## 2019-09-06 ENCOUNTER — APPOINTMENT (OUTPATIENT)
Dept: DERMATOLOGY | Facility: CLINIC | Age: 24
End: 2019-09-06

## 2019-11-25 NOTE — ED BEHAVIORAL HEALTH ASSESSMENT NOTE - SUICIDE RISK FACTORS
Access to means (pills, firearms, etc.)/Hopelessness/Mood episode/Highly impulsive behavior/Substance abuse/dependence

## 2020-01-05 ENCOUNTER — EMERGENCY (EMERGENCY)
Facility: HOSPITAL | Age: 25
LOS: 0 days | Discharge: HOME | End: 2020-01-05
Attending: EMERGENCY MEDICINE
Payer: COMMERCIAL

## 2020-01-05 VITALS
HEIGHT: 64 IN | RESPIRATION RATE: 18 BRPM | DIASTOLIC BLOOD PRESSURE: 92 MMHG | HEART RATE: 133 BPM | TEMPERATURE: 98 F | WEIGHT: 154.98 LBS | OXYGEN SATURATION: 95 % | SYSTOLIC BLOOD PRESSURE: 115 MMHG

## 2020-01-05 VITALS
SYSTOLIC BLOOD PRESSURE: 116 MMHG | TEMPERATURE: 98 F | RESPIRATION RATE: 16 BRPM | HEART RATE: 81 BPM | OXYGEN SATURATION: 97 % | DIASTOLIC BLOOD PRESSURE: 72 MMHG
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
## 2020-01-05 DIAGNOSIS — F32.9 MAJOR DEPRESSIVE DISORDER, SINGLE EPISODE, UNSPECIFIED: ICD-10-CM

## 2020-01-05 DIAGNOSIS — R53.83 OTHER FATIGUE: ICD-10-CM

## 2020-01-05 DIAGNOSIS — T42.4X1A POISONING BY BENZODIAZEPINES, ACCIDENTAL (UNINTENTIONAL), INITIAL ENCOUNTER: ICD-10-CM

## 2020-01-05 DIAGNOSIS — F43.24 ADJUSTMENT DISORDER WITH DISTURBANCE OF CONDUCT: ICD-10-CM

## 2020-01-05 DIAGNOSIS — Z90.89 ACQUIRED ABSENCE OF OTHER ORGANS: Chronic | ICD-10-CM

## 2020-01-05 DIAGNOSIS — Z79.899 OTHER LONG TERM (CURRENT) DRUG THERAPY: ICD-10-CM

## 2020-01-05 DIAGNOSIS — F41.9 ANXIETY DISORDER, UNSPECIFIED: ICD-10-CM

## 2020-01-05 DIAGNOSIS — F98.8 OTHER SPECIFIED BEHAVIORAL AND EMOTIONAL DISORDERS WITH ONSET USUALLY OCCURRING IN CHILDHOOD AND ADOLESCENCE: ICD-10-CM

## 2020-01-05 DIAGNOSIS — R69 ILLNESS, UNSPECIFIED: ICD-10-CM

## 2020-01-05 LAB
ALBUMIN SERPL ELPH-MCNC: 4.1 G/DL — SIGNIFICANT CHANGE UP (ref 3.3–5)
ALP SERPL-CCNC: 51 U/L — SIGNIFICANT CHANGE UP (ref 40–120)
ALT FLD-CCNC: 47 U/L — SIGNIFICANT CHANGE UP (ref 12–78)
ANION GAP SERPL CALC-SCNC: 8 MMOL/L — SIGNIFICANT CHANGE UP (ref 5–17)
APAP SERPL-MCNC: < 2 UG/ML (ref 10–30)
APPEARANCE UR: CLEAR — SIGNIFICANT CHANGE UP
APTT BLD: 28.8 SEC — SIGNIFICANT CHANGE UP (ref 27.5–36.3)
AST SERPL-CCNC: 65 U/L — HIGH (ref 15–37)
BASOPHILS # BLD AUTO: 0.04 K/UL — SIGNIFICANT CHANGE UP (ref 0–0.2)
BASOPHILS NFR BLD AUTO: 0.5 % — SIGNIFICANT CHANGE UP (ref 0–2)
BILIRUB SERPL-MCNC: 2.1 MG/DL — HIGH (ref 0.2–1.2)
BILIRUB UR-MCNC: NEGATIVE — SIGNIFICANT CHANGE UP
BUN SERPL-MCNC: 22 MG/DL — SIGNIFICANT CHANGE UP (ref 7–23)
CALCIUM SERPL-MCNC: 8.7 MG/DL — SIGNIFICANT CHANGE UP (ref 8.5–10.1)
CHLORIDE SERPL-SCNC: 112 MMOL/L — HIGH (ref 96–108)
CO2 SERPL-SCNC: 26 MMOL/L — SIGNIFICANT CHANGE UP (ref 22–31)
COLOR SPEC: YELLOW — SIGNIFICANT CHANGE UP
CREAT SERPL-MCNC: 1.29 MG/DL — SIGNIFICANT CHANGE UP (ref 0.5–1.3)
DIFF PNL FLD: NEGATIVE — SIGNIFICANT CHANGE UP
EOSINOPHIL # BLD AUTO: 0.09 K/UL — SIGNIFICANT CHANGE UP (ref 0–0.5)
EOSINOPHIL NFR BLD AUTO: 1.1 % — SIGNIFICANT CHANGE UP (ref 0–6)
ETHANOL SERPL-MCNC: 49 MG/DL — HIGH (ref 0–10)
GLUCOSE SERPL-MCNC: 94 MG/DL — SIGNIFICANT CHANGE UP (ref 70–99)
GLUCOSE UR QL: NEGATIVE MG/DL — SIGNIFICANT CHANGE UP
HCT VFR BLD CALC: 43.6 % — SIGNIFICANT CHANGE UP (ref 39–50)
HGB BLD-MCNC: 14.7 G/DL — SIGNIFICANT CHANGE UP (ref 13–17)
IMM GRANULOCYTES NFR BLD AUTO: 0.2 % — SIGNIFICANT CHANGE UP (ref 0–1.5)
INR BLD: 1.07 RATIO — SIGNIFICANT CHANGE UP (ref 0.88–1.16)
KETONES UR-MCNC: NEGATIVE — SIGNIFICANT CHANGE UP
LEUKOCYTE ESTERASE UR-ACNC: NEGATIVE — SIGNIFICANT CHANGE UP
LYMPHOCYTES # BLD AUTO: 3.08 K/UL — SIGNIFICANT CHANGE UP (ref 1–3.3)
LYMPHOCYTES # BLD AUTO: 37.3 % — SIGNIFICANT CHANGE UP (ref 13–44)
MCHC RBC-ENTMCNC: 28.8 PG — SIGNIFICANT CHANGE UP (ref 27–34)
MCHC RBC-ENTMCNC: 33.7 GM/DL — SIGNIFICANT CHANGE UP (ref 32–36)
MCV RBC AUTO: 85.5 FL — SIGNIFICANT CHANGE UP (ref 80–100)
MONOCYTES # BLD AUTO: 0.96 K/UL — HIGH (ref 0–0.9)
MONOCYTES NFR BLD AUTO: 11.6 % — SIGNIFICANT CHANGE UP (ref 2–14)
NEUTROPHILS # BLD AUTO: 4.07 K/UL — SIGNIFICANT CHANGE UP (ref 1.8–7.4)
NEUTROPHILS NFR BLD AUTO: 49.3 % — SIGNIFICANT CHANGE UP (ref 43–77)
NITRITE UR-MCNC: NEGATIVE — SIGNIFICANT CHANGE UP
PCP SPEC-MCNC: SIGNIFICANT CHANGE UP
PH UR: 5 — SIGNIFICANT CHANGE UP (ref 5–8)
PLATELET # BLD AUTO: 197 K/UL — SIGNIFICANT CHANGE UP (ref 150–400)
POTASSIUM SERPL-MCNC: 3.7 MMOL/L — SIGNIFICANT CHANGE UP (ref 3.5–5.3)
POTASSIUM SERPL-SCNC: 3.7 MMOL/L — SIGNIFICANT CHANGE UP (ref 3.5–5.3)
PROT SERPL-MCNC: 7.1 GM/DL — SIGNIFICANT CHANGE UP (ref 6–8.3)
PROT UR-MCNC: NEGATIVE MG/DL — SIGNIFICANT CHANGE UP
PROTHROM AB SERPL-ACNC: 11.9 SEC — SIGNIFICANT CHANGE UP (ref 10–12.9)
RBC # BLD: 5.1 M/UL — SIGNIFICANT CHANGE UP (ref 4.2–5.8)
RBC # FLD: 12.1 % — SIGNIFICANT CHANGE UP (ref 10.3–14.5)
SALICYLATES SERPL-MCNC: <1.7 MG/DL (ref 2.8–20)
SODIUM SERPL-SCNC: 146 MMOL/L — HIGH (ref 135–145)
SP GR SPEC: 1.02 — SIGNIFICANT CHANGE UP (ref 1.01–1.02)
UROBILINOGEN FLD QL: NEGATIVE MG/DL — SIGNIFICANT CHANGE UP
WBC # BLD: 8.26 K/UL — SIGNIFICANT CHANGE UP (ref 3.8–10.5)
WBC # FLD AUTO: 8.26 K/UL — SIGNIFICANT CHANGE UP (ref 3.8–10.5)

## 2020-01-05 PROCEDURE — 90792 PSYCH DIAG EVAL W/MED SRVCS: CPT | Mod: GT

## 2020-01-05 PROCEDURE — 99285 EMERGENCY DEPT VISIT HI MDM: CPT

## 2020-01-05 PROCEDURE — 85730 THROMBOPLASTIN TIME PARTIAL: CPT

## 2020-01-05 PROCEDURE — 99285 EMERGENCY DEPT VISIT HI MDM: CPT | Mod: 25

## 2020-01-05 PROCEDURE — 85610 PROTHROMBIN TIME: CPT

## 2020-01-05 PROCEDURE — 93005 ELECTROCARDIOGRAM TRACING: CPT

## 2020-01-05 PROCEDURE — 85025 COMPLETE CBC W/AUTO DIFF WBC: CPT

## 2020-01-05 PROCEDURE — 80307 DRUG TEST PRSMV CHEM ANLYZR: CPT

## 2020-01-05 PROCEDURE — 93010 ELECTROCARDIOGRAM REPORT: CPT

## 2020-01-05 PROCEDURE — 81003 URINALYSIS AUTO W/O SCOPE: CPT

## 2020-01-05 PROCEDURE — 96360 HYDRATION IV INFUSION INIT: CPT

## 2020-01-05 PROCEDURE — 80053 COMPREHEN METABOLIC PANEL: CPT

## 2020-01-05 PROCEDURE — 36415 COLL VENOUS BLD VENIPUNCTURE: CPT

## 2020-01-05 RX ORDER — SODIUM CHLORIDE 9 MG/ML
1000 INJECTION INTRAMUSCULAR; INTRAVENOUS; SUBCUTANEOUS
Refills: 0 | Status: DISCONTINUED | OUTPATIENT
Start: 2020-01-05 | End: 2020-01-05

## 2020-01-05 RX ORDER — SODIUM CHLORIDE 9 MG/ML
1000 INJECTION INTRAMUSCULAR; INTRAVENOUS; SUBCUTANEOUS ONCE
Refills: 0 | Status: COMPLETED | OUTPATIENT
Start: 2020-01-05 | End: 2020-01-05

## 2020-01-05 RX ADMIN — SODIUM CHLORIDE 1000 MILLILITER(S): 9 INJECTION INTRAMUSCULAR; INTRAVENOUS; SUBCUTANEOUS at 04:09

## 2020-01-05 RX ADMIN — SODIUM CHLORIDE 1000 MILLILITER(S): 9 INJECTION INTRAMUSCULAR; INTRAVENOUS; SUBCUTANEOUS at 05:09

## 2020-01-05 NOTE — ED BEHAVIORAL HEALTH ASSESSMENT NOTE - DETAILS
see HPI rash with lamictal, "racing thoughts" on Vyvanse depression/anxiety in family; no known suicide attempt "a little bit drowsy" mother

## 2020-01-05 NOTE — ED BEHAVIORAL HEALTH ASSESSMENT NOTE - SAFETY PLAN ADDT'L DETAILS
Education provided regarding environmental safety / lethal means restriction/Provision of National Suicide Prevention Lifeline 3-562-605-UUYO (8825)/Safety plan discussed with...

## 2020-01-05 NOTE — ED BEHAVIORAL HEALTH ASSESSMENT NOTE - HPI (INCLUDE ILLNESS QUALITY, SEVERITY, DURATION, TIMING, CONTEXT, MODIFYING FACTORS, ASSOCIATED SIGNS AND SYMPTOMS)
Patient is a 24 year old  male, single, noncaregiver, domiciled with parents, employed part time as a , with reported PPhx of depression, anxiety, and ADHD, currently in outpatient treatment with Dr. Kailash Jack on Lexapro and Seroquel, long hx of xanax and alcohol abuse including hx of arrest for substance possession and court mandated rehab treatment, denies hx of w/d seizures/DTs, 2 prior inpt hospitalizations for making suicidal statements in the context of substance intox (most recently  Feb 2019), denies hx of suicide attempts or NSSIB, denies hx of violence, denies hx of trauma, and denies significant PMhx. Patient presents today BIBEMS activated by pt's mother s/p impulsive overdose of 15 Xanax 2mg tabs after his mother found his "xanax hiding spot" at home.    Patient states that he has been in his usual state of health until around 2am last night when his mother found his xanax supply hidden in his home. States that his mother became upset and threw the tablets into a bottle of water to try and dissolve them. Patient became angry and "as crazy as I am" he drank the bottle of water that contained the xanax pills. States that he did it "to get fucked up", "I had no suicidal tendencies". States that his actions were not suicidal in nature and denies current SI/HI/I/P, urges for SIB, or aggressive I/I/P. States that he had also had "1-2 beers" last night, otherwise denies co-ingestion of any other substances. States he is using Xanax 4mg q1-2days purchased off the streets. Reports drinking ETOH 7-10 beers per month (often binge drinks when he goes to bars with his friends). Reports use of MJ and opiate pain killers in the past (reports last use of both substances several months ago despite +Utox) but does not use them regularly. Denies use of any other substances. Denies hx of w/d, seizures, DTs, medical hospitalizations, or medical problems 2/2 substance abuse. Otherwise denies all complaints, states his mood has been "not too bad", reports good sleep (approx 7 hrs per night), appetite, and energy level. Reports chronically poor concentration 2/2 ADHD with hx of stimulant prescriptions however denies current use of stimulants. Denies symptoms consistent with santos or psychosis. At this time pt reports ambivalent motivation to seek treatment for substance abuse, states he would be willing to take outpatient referrals but is not interested in inpt treatment at this time.    See  note for collateral from pt's mother.    Attempted to call pt's psychiatrist Dr. Kailash Jack, [phone].  received that office is closed until Jan 6th. Message was left advising Dr. Jack to f/u with pt this week.

## 2020-01-05 NOTE — ED PROVIDER NOTE - OBJECTIVE STATEMENT
25 yo male with h/o anxiety and depression and a h/o xanax abuse s/p overdose.  Patient states he takes xanax approx 4 mg daily.  Tonight he was caught by his mom with a bunch of xanax 2mg bars.  She took the pills and threw them in a bottle of water.  He took the bottle and drank it.  This was at about 130am.  He also took about 3 bars 2-3 hours before that.  He is feeling sleepy and "pretty good" and has no complaints. Drank 3 beers earlier tonight.  Denies any other drugs. 25 yo male with h/o anxiety and depression and a h/o xanax abuse s/p overdose.  Patient states he takes xanax approx 4 mg daily.  Tonight he was caught by his mom with a bunch of xanax 2mg bars.  She took the pills and threw them in a bottle of water.  He took the bottle and drank it.  This was at about 130am.  He also took about 3 bars 2-3 hours before that.  He is feeling sleepy and "pretty good" and has no complaints. Drank 3 beers earlier tonight.  Denies any other drugs.  Pt parents concerned about his anxiety, increasing and interfering with his function and leading back to drug use. 23 yo male with h/o anxiety and depression and a h/o xanax abuse s/p overdose.  Patient states he takes xanax approx 4 mg daily.  Tonight he was caught by his mom with a bunch of xanax 2mg bars.  She took the pills and threw them in a bottle of water.  He took the bottle and drank it.  This was at about 130am.  He also took about 3 bars 2-3 hours before that.  He is feeling sleepy and "pretty good" and has no complaints. Drank 3 beers earlier tonight.  Denies any other drugs. Pt denies suicidality.  Pt parents concerned about his anxiety, increasing and interfering with his function and leading back to drug use.

## 2020-01-05 NOTE — ED BEHAVIORAL HEALTH ASSESSMENT NOTE - VIOLENCE PROTECTIVE FACTORS:
Good treatment response/compliance/Employment stability/Residential stability/Insight into violence risk and need for management/treatment/Engagement in treatment

## 2020-01-05 NOTE — ED BEHAVIORAL HEALTH ASSESSMENT NOTE - SAFETY PLAN DETAILS
Rian Burr Safety plan completed with patient and a copy was provided to pt on discharge. Return to the ED or call 911 with worsening symptoms, SI/HI. or any other imminent safety concerns.

## 2020-01-05 NOTE — ED BEHAVIORAL HEALTH ASSESSMENT NOTE - SUICIDE PROTECTIVE FACTORS
Has future plans/Supportive social network of family or friends/Positive therapeutic relationships/Identifies reasons for living/Responsibility to family and others/Engaged in work or school

## 2020-01-05 NOTE — ED ADULT TRIAGE NOTE - CHIEF COMPLAINT QUOTE
pt took 12 pills of xanax (2mg each) tonight. pt states "I took these to get through the day quicker." Denies SI or HI. no narcan given on scene. Pt awake alert and oriented x4 in Triage. Denies any pain or complaints.

## 2020-01-05 NOTE — ED PROVIDER NOTE - PROGRESS NOTE DETAILS
d/w [place] Poison Control.  [facility] Tox fellow paged 7 times over the past 3 hours, but no answer.  Poison control recommends observation for 12 hours for possible CNS depression. Maria G SHOEMAKER for ED attending Dr. Cardenas: Pt is clinically sober, cleared from my perspective to go home. Psych currently discussing disposition with pt. Patient is cleared by telepsychiatry. Outpatient follow up and instructions to return if any worsening of symptoms or if any health concerns or if thoughts of depressive thoughts, etc. Patient is cleared by telepsychiatry. Outpatient follow up and instructions to return if any worsening of symptoms or if any health concerns or if thoughts of depressive thoughts, etc. Patient is ambulatory, no distress, cleared by psych prior to dc.

## 2020-01-05 NOTE — ED PROVIDER NOTE - PATIENT PORTAL LINK FT
You can access the FollowMyHealth Patient Portal offered by [facility] by registering at the following website: [email or web address]. By joining [employer]’s FollowMyHealth portal, you will also be able to view your health information using other applications (apps) compatible with our system.

## 2020-01-05 NOTE — ED BEHAVIORAL HEALTH ASSESSMENT NOTE - SUICIDE RISK FACTORS
Mood Disorder current/past/Cluster B Personality disorders or traits current/past/Impulsivity/Alcohol/Substance abuse disorders

## 2020-01-05 NOTE — ED BEHAVIORAL HEALTH ASSESSMENT NOTE - RISK ASSESSMENT
Risk factors include substance abuse and hx of hospitalizations for suicidal ideation. Protective factors include no current SI/HI, denies hx of suicide attempts, future oriented, engaged in work, supportive social system, engaged in treatment, compliant with meds, no current mood/psychotic symptoms, no current anxiety/insomnia symptoms, no access to firearms, and able to engage in safety planning. At this time pt is at low acute risk of harm but at elevated chronic risk which can be mitigated with substance abuse treatment. Referrals provided. Patient also advised of risks of w/d, driving impairment while intox, and risks of mixing xanax with ETOH. Low Acute Suicide Risk

## 2020-01-05 NOTE — ED ADULT NURSE NOTE - OBJECTIVE STATEMENT
Pt presented to ED c/o xanax overdose. Pt took 3 pills of xanax and had an argument with his mother. She had found 15-20 pills of xanax in his room and threw them into a glass of water to get rid of them. Pt took the glass of water and drank the whole thing. No nausea, vomiting. Pt denies SI/HI at this time. EMS did not give Narcan on scene or en route. Pt is AAOx4 at this time, repeatedly asking for water and saying that "you can't overdose on xanax, it's a myth". Slow speech noted. Unsteady gait. Safety/fall precautions. Stripped of property and clothing, given to parents. Upon arrival 12 lead ekg performed. Maintained on tele monitor, spO2, and end tidal CO2 monitoring. Supplemental O2 provided.

## 2020-01-05 NOTE — ED PROVIDER NOTE - CONSTITUTIONAL, MLM
normal... Well appearing, awake, sleepy, oriented to person, place, time/situation and in no apparent distress.

## 2020-01-05 NOTE — ED BEHAVIORAL HEALTH NOTE - BEHAVIORAL HEALTH NOTE
After telepsychiatry requested assistance in obtaining collateral from pt's mother, SW reached out to pt's mother Karen Bolden via phone ([phone], [phone]). Per pt's mother pt has been using xanax on and off for the last couple of months. Per mother, she found approx. 15 pills in his room. She reportedly threw pills in a bottle of water. Mother reports they then had an argument and after pills dissolved, pt grabbed bottle and drank the water. Per mother, pt used xanax on and off, stopped for many years, and started up again in the last 6 months. Per mother, pt reported he would use 2 "bars" then wait a day or 2, then take 2 more. Mother reported pt was very angry, but denied pt was physically aggressive or aggressive towards property. Mother reported pt has a history of threatening suicide in the past and has been hospitalized in the past of threatening suicide and taking too much xanax. Mother indicated no recent suicide threats, per mother last time was probably about a year and a half ago. In the past, mother reports pt would take xanax and then indicate that he was going to kill himself. Mother reports when in high school and after high school, she would find search history on the computer re: how to kill yourself (not recently), but mother denies previous SA. Mother reports she would feel comfortable with pt returning home. Per mother, pt in treatment with Dr. Kailash Blankenship, Psychiatrist ([phone]). Information relayed to telepsychiatry for further follow up/dc planning. SW remains available should further assistance be required.

## 2020-01-05 NOTE — ED BEHAVIORAL HEALTH ASSESSMENT NOTE - DIFFERENTIAL
adjustment disorder with disturbance of conduct  xanax intoxication  xanax use disorder  r/o personality disorder

## 2020-01-05 NOTE — ED PROVIDER NOTE - NSFOLLOWUPINSTRUCTIONS_ED_ALL_ED_FT
Please return to us immediately if you have any chest pain, shortness of breath, difficulty speaking, difficulty walking, or if you have any depressive thoughts, thoughts of self injury or for any other health concerns including if you are unable to follow up with your primary care physician and/or psychiatrist. Please avoid hard drugs if possible, and return to us if you need any assistance.     ______________________________    Here is some reading on Benzodiazepine overdose:    Benzodiazepine Overdose  Benzodiazepines are prescription medicines that decrease the activity of (depress) the central nervous system and cause changes in certain brain chemicals (neurotransmitters). The most commonly prescribed benzodiazepines are:  Alprazolam.Lorazepam.Clonazepam.Diazepam.Temazepam.A benzodiazepine overdose happens when you take too much of your medicine. The effects of an overdose can be mild, dangerous, or even deadly. Benzodiazepine overdose is a medical emergency.  What are the causes?  This condition may be caused by:  Taking too much of a medicine by accident.Taking too much of a medicine on purpose.An error made by a health care provider who prescribes a medicine.An error made by the pharmacist who fills the prescription order.What increases the risk?  This condition is more likely in:  Children. They may be attracted to colorful pills. Because of a child's small size, even a small amount of a medicine can be dangerous.Elderly people. They may be taking many different medicines. Elderly people may have difficulty reading labels or remembering when they last took their medicine.People who use:  Illegal drugs.Other substances, including alcohol, while taking benzodiazepines.People who have:  A history of drug or alcohol abuse.Certain mental health conditions.Breathing problems.Liver problems.What are the signs or symptoms?  Symptoms of this condition depend on the type of medicine and the amount that was taken. Common symptoms of this condition include:  Drowsiness.Confusion.Lack of energy.Slurred speech.Clumsiness.Muscle weakness.Dizziness.Slow breathing.Coma.Death from a benzodiazepine overdose is rare. Death is more likely if benzodiazepines are taken at the same time as other central nervous system depressants, such as alcohol.  How is this diagnosed?  This condition is diagnosed based on your symptoms, medical history, a physical exam, and tests. It is important to tell your health care provider:  About all of the medicines that you took.When you took the medicines.Whether you have been drinking alcohol or using other substances.Your health care provider will do a physical exam. This exam may include:  Checking and monitoring your heart rate and rhythm, your temperature, and your blood pressure (vital signs).Checking your breathing and blood oxygen level.You may also have blood tests or urine tests.  How is this treated?  This condition may be treated by supporting your vital signs and your breathing. A benzodiazepine overdose may also be treated by:  Giving fluids and minerals (electrolytes) through an IV.Inserting a breathing tube (endotracheal tube) in your airway to help you breathe.Passing a tube through your nose and into your stomach (NG tube or nasogastric tube) to remove the contents of your stomach.Giving medicines that:  Increase your blood pressure.Reverse the effects of the benzodiazepine (flumazenil).Absorb any benzodiazepine that is in your digestive system. This treatment is rare.Ongoing counseling and mental health support if you intentionally overdosed or used an illegal drug.Follow these instructions at home:     Take over-the-counter and prescription medicines only as told by your health care provider. Always ask your health care provider about possible side effects and interactions of any new medicine that you start taking.Keep a list of all the medicines that you take, including over-the-counter medicines. Bring this list with you to all of your medical visits.Drink enough fluid to keep your urine pale yellow.Keep all follow-up visits as told by your health care provider. This is important.How is this prevented?     Get help if you are struggling with:  Alcohol or drug use.Depression or another mental health problem.Keep the phone number of your local poison control center near your phone or on your cell phone.Store all medicines in safety containers that are out of the reach of children.Read the drug inserts that come with your medicines.Do not drink alcohol when taking benzodiazepines.Do not use illegal drugs.Do not take benzodiazepines that are not prescribed for you.Contact a health care provider if:  Your symptoms return.You develop new symptoms or side effects when you take medicines.Get help right away if:  You think that you or someone else may have taken too much of a benzodiazepine. The hotline of the National Poison Control Center is ([phone] 746-4390.You or someone else is having symptoms of a benzodiazepine overdose.You have serious thoughts about hurting yourself or others.You have:  Difficulty breathing.A loss of consciousness.A seizure.You feel dizzy all the time.You feel weak or you faint.Benzodiazepine overdose is an emergency. Do not wait to see if the symptoms will go away. Get medical help right away. Call your local emergency services (911 in the [place] Do not drive yourself to the hospital.   Summary  A benzodiazepine overdose happens when you take too much of your medicine.The effects of an overdose can be mild, dangerous, or even deadly.This condition may be treated by supporting your vital signs and your breathing.Get medical help right away if you think that you or someone else may have taken too much of a benzodiazepine.This information is not intended to replace advice given to you by your health care provider. Make sure you discuss any questions you have with your health care provider.

## 2020-01-05 NOTE — ED BEHAVIORAL HEALTH ASSESSMENT NOTE - DESCRIPTION
Per chart review and ED provider: Patient arrived BIBEMS accompanied by mother s/p xanax abuse, arrived a bit drowsy however no other complaints noted. BAL 49 on arrival. Patient was cooperative with triage process and all ED safety protocols. Willingly provided blood/urine samples. Has been cooperative, friendly, socializing with staff, euthymic, organized thought process, clear speech, at times even makes jokes. Does not appear drowsy or somnolent. No agitation noted and no restraints required. Pt's mother has been in the ED, appropriate dynamic noted between them. denies lives with parents; obtained associate's degree at [employer]

## 2020-01-05 NOTE — ED PROVIDER NOTE - NS_ ATTENDINGSCRIBEDETAILS _ED_A_ED_FT
I Darnell Cardenas MD saw and examined the patient. Scribe documented for me and under my supervision. I have modified the scribe's documentation where necessary to reflect my history, physical exam and other relevant documentations pertinent to the care of the patient.

## 2020-01-05 NOTE — ED BEHAVIORAL HEALTH ASSESSMENT NOTE - SUMMARY
Patient is a 24 year old  male, single, noncaregiver, domiciled with parents, employed part time as a , with reported PPhx of depression, anxiety, and ADHD, currently in outpatient treatment with Dr. Kailash Jack on Lexapro and Seroquel, long hx of xanax and alcohol abuse including hx of arrest for substance possession and court mandated rehab treatment, denies hx of w/d seizures/DTs, 2 prior inpt hospitalizations for making suicidal statements in the context of substance intox (most recently HH Feb 2019), denies hx of suicide attempts or NSSIB, denies hx of violence, denies hx of trauma, and denies significant PMhx. Patient presents today BIBEMS activated by pt's mother s/p impulsive overdose of 15 Xanax 2mg tabs after his mother found his "xanax hiding spot" at home.    On evaluation pt reports his actions were recreational in nature rather than suicidal. Denies current SI/HI/I/P, urges for SIB, or aggressive I/I/P. Denies disturbance of mood or sleep; denies all symptoms of psychosis or santos. Collateral from pt's mother confirms that pt's actions were 2/2 substance abuse rather than with intent or self harm. Patient reports ambivalent motivation to stop using substance but is willing to accept outpatient substance abuse treatment referrals. Pt's mother is agreeable to take pt home once psychiatrically and medically cleared. Patient was offered voluntary hospitalization but declined and does not meet criteria for involuntary commitment. At this time pt is stable for discharge.

## 2020-02-18 ENCOUNTER — EMERGENCY (EMERGENCY)
Facility: HOSPITAL | Age: 25
LOS: 0 days | Discharge: HOME | End: 2020-02-19
Attending: EMERGENCY MEDICINE
Payer: COMMERCIAL

## 2020-02-18 VITALS
SYSTOLIC BLOOD PRESSURE: 118 MMHG | OXYGEN SATURATION: 97 % | DIASTOLIC BLOOD PRESSURE: 73 MMHG | TEMPERATURE: 98 F | RESPIRATION RATE: 18 BRPM | HEART RATE: 70 BPM

## 2020-02-18 DIAGNOSIS — R10.84 GENERALIZED ABDOMINAL PAIN: ICD-10-CM

## 2020-02-18 DIAGNOSIS — Z90.89 ACQUIRED ABSENCE OF OTHER ORGANS: Chronic | ICD-10-CM

## 2020-02-18 DIAGNOSIS — F32.9 MAJOR DEPRESSIVE DISORDER, SINGLE EPISODE, UNSPECIFIED: ICD-10-CM

## 2020-02-18 DIAGNOSIS — R10.9 UNSPECIFIED ABDOMINAL PAIN: ICD-10-CM

## 2020-02-18 LAB
APPEARANCE UR: CLEAR — SIGNIFICANT CHANGE UP
BASOPHILS # BLD AUTO: 0.05 K/UL — SIGNIFICANT CHANGE UP (ref 0–0.2)
BASOPHILS NFR BLD AUTO: 0.6 % — SIGNIFICANT CHANGE UP (ref 0–2)
BILIRUB UR-MCNC: ABNORMAL
COLOR SPEC: YELLOW — SIGNIFICANT CHANGE UP
DIFF PNL FLD: NEGATIVE — SIGNIFICANT CHANGE UP
EOSINOPHIL # BLD AUTO: 0.22 K/UL — SIGNIFICANT CHANGE UP (ref 0–0.5)
EOSINOPHIL NFR BLD AUTO: 2.9 % — SIGNIFICANT CHANGE UP (ref 0–6)
GLUCOSE UR QL: NEGATIVE MG/DL — SIGNIFICANT CHANGE UP
HCT VFR BLD CALC: 48.2 % — SIGNIFICANT CHANGE UP (ref 39–50)
HGB BLD-MCNC: 15.9 G/DL — SIGNIFICANT CHANGE UP (ref 13–17)
IMM GRANULOCYTES NFR BLD AUTO: 0.8 % — SIGNIFICANT CHANGE UP (ref 0–1.5)
KETONES UR-MCNC: ABNORMAL
LEUKOCYTE ESTERASE UR-ACNC: NEGATIVE — SIGNIFICANT CHANGE UP
LYMPHOCYTES # BLD AUTO: 1.73 K/UL — SIGNIFICANT CHANGE UP (ref 1–3.3)
LYMPHOCYTES # BLD AUTO: 22.4 % — SIGNIFICANT CHANGE UP (ref 13–44)
MCHC RBC-ENTMCNC: 28.6 PG — SIGNIFICANT CHANGE UP (ref 27–34)
MCHC RBC-ENTMCNC: 33 GM/DL — SIGNIFICANT CHANGE UP (ref 32–36)
MCV RBC AUTO: 86.8 FL — SIGNIFICANT CHANGE UP (ref 80–100)
MONOCYTES # BLD AUTO: 0.73 K/UL — SIGNIFICANT CHANGE UP (ref 0–0.9)
MONOCYTES NFR BLD AUTO: 9.5 % — SIGNIFICANT CHANGE UP (ref 2–14)
NEUTROPHILS # BLD AUTO: 4.92 K/UL — SIGNIFICANT CHANGE UP (ref 1.8–7.4)
NEUTROPHILS NFR BLD AUTO: 63.8 % — SIGNIFICANT CHANGE UP (ref 43–77)
NITRITE UR-MCNC: NEGATIVE — SIGNIFICANT CHANGE UP
PH UR: 5 — SIGNIFICANT CHANGE UP (ref 5–8)
PLATELET # BLD AUTO: 195 K/UL — SIGNIFICANT CHANGE UP (ref 150–400)
PROT UR-MCNC: 15 MG/DL
RBC # BLD: 5.55 M/UL — SIGNIFICANT CHANGE UP (ref 4.2–5.8)
RBC # FLD: 12.2 % — SIGNIFICANT CHANGE UP (ref 10.3–14.5)
SP GR SPEC: 1.02 — SIGNIFICANT CHANGE UP (ref 1.01–1.02)
UROBILINOGEN FLD QL: 4 MG/DL
WBC # BLD: 7.71 K/UL — SIGNIFICANT CHANGE UP (ref 3.8–10.5)
WBC # FLD AUTO: 7.71 K/UL — SIGNIFICANT CHANGE UP (ref 3.8–10.5)

## 2020-02-18 PROCEDURE — 99284 EMERGENCY DEPT VISIT MOD MDM: CPT

## 2020-02-18 PROCEDURE — 96374 THER/PROPH/DIAG INJ IV PUSH: CPT | Mod: XU

## 2020-02-18 PROCEDURE — 96375 TX/PRO/DX INJ NEW DRUG ADDON: CPT

## 2020-02-18 PROCEDURE — 36415 COLL VENOUS BLD VENIPUNCTURE: CPT

## 2020-02-18 PROCEDURE — 85025 COMPLETE CBC W/AUTO DIFF WBC: CPT

## 2020-02-18 PROCEDURE — 83690 ASSAY OF LIPASE: CPT

## 2020-02-18 PROCEDURE — 96361 HYDRATE IV INFUSION ADD-ON: CPT

## 2020-02-18 PROCEDURE — 81001 URINALYSIS AUTO W/SCOPE: CPT

## 2020-02-18 PROCEDURE — 80053 COMPREHEN METABOLIC PANEL: CPT

## 2020-02-18 PROCEDURE — 99284 EMERGENCY DEPT VISIT MOD MDM: CPT | Mod: 25

## 2020-02-18 PROCEDURE — 74177 CT ABD & PELVIS W/CONTRAST: CPT

## 2020-02-18 RX ORDER — KETOROLAC TROMETHAMINE 30 MG/ML
30 SYRINGE (ML) INJECTION ONCE
Refills: 0 | Status: DISCONTINUED | OUTPATIENT
Start: 2020-02-18 | End: 2020-02-18

## 2020-02-18 RX ORDER — SODIUM CHLORIDE 9 MG/ML
1000 INJECTION INTRAMUSCULAR; INTRAVENOUS; SUBCUTANEOUS ONCE
Refills: 0 | Status: COMPLETED | OUTPATIENT
Start: 2020-02-18 | End: 2020-02-18

## 2020-02-18 RX ORDER — LIDOCAINE 4 G/100G
10 CREAM TOPICAL ONCE
Refills: 0 | Status: COMPLETED | OUTPATIENT
Start: 2020-02-18 | End: 2020-02-18

## 2020-02-18 RX ORDER — FAMOTIDINE 10 MG/ML
20 INJECTION INTRAVENOUS ONCE
Refills: 0 | Status: COMPLETED | OUTPATIENT
Start: 2020-02-18 | End: 2020-02-18

## 2020-02-18 RX ADMIN — FAMOTIDINE 20 MILLIGRAM(S): 10 INJECTION INTRAVENOUS at 23:26

## 2020-02-18 RX ADMIN — LIDOCAINE 10 MILLILITER(S): 4 CREAM TOPICAL at 23:26

## 2020-02-18 RX ADMIN — Medication 30 MILLILITER(S): at 23:26

## 2020-02-18 RX ADMIN — Medication 30 MILLIGRAM(S): at 23:26

## 2020-02-18 RX ADMIN — SODIUM CHLORIDE 1000 MILLILITER(S): 9 INJECTION INTRAMUSCULAR; INTRAVENOUS; SUBCUTANEOUS at 23:26

## 2020-02-18 NOTE — ED PROVIDER NOTE - OBJECTIVE STATEMENT
23 y/o male in ED c/o diffuse burning achy abd pain radiating to back x 2 days.   states took naproxen without relief.   mother states pt has been working out "a lot".   pt denies any fever, HA, cp, sob, n/v/d.   states increase pain with movement and palpation.    pt denies any other trauma.   no sick contacts or recent travel

## 2020-02-18 NOTE — ED PROVIDER NOTE - PROGRESS NOTE DETAILS
pt and mother told of results.   pt states still with pain.   will CT and reeval pt and mother told of results.    states feels better and will f/u

## 2020-02-18 NOTE — ED PROVIDER NOTE - PATIENT PORTAL LINK FT
You can access the FollowMyHealth Patient Portal offered by [facility] by registering at the following website: [email or web address]. By joining [employer]’s FollowMyHealth portal, you will also be able to view your health information using other applications (apps) compatible with our system.

## 2020-02-18 NOTE — ED PROVIDER NOTE - NSFOLLOWUPINSTRUCTIONS_ED_ALL_ED_FT
please follow up with your doctor in 2-3 days.   take medications as prescribed.    drink plenty of fluids.   return to ED for any concerns please follow up with your doctor in 2-3 days.   take motrin and tylenol for pain.    drink plenty of fluids.   return to ED for any concerns

## 2020-02-18 NOTE — ED PROVIDER NOTE - CLINICAL SUMMARY MEDICAL DECISION MAKING FREE TEXT BOX
pt with achy burning diffuse abd pain with radiation to back x 2 days possible pancreatitis, derrick, ulcer, gastriits or MSK pain.    will check labs, UA, IVF, meds and reeval

## 2020-02-19 VITALS
OXYGEN SATURATION: 99 % | RESPIRATION RATE: 16 BRPM | HEART RATE: 69 BPM | DIASTOLIC BLOOD PRESSURE: 84 MMHG | SYSTOLIC BLOOD PRESSURE: 122 MMHG | TEMPERATURE: 98 F

## 2020-02-19 LAB
ALBUMIN SERPL ELPH-MCNC: 4.5 G/DL — SIGNIFICANT CHANGE UP (ref 3.3–5)
ALP SERPL-CCNC: 59 U/L — SIGNIFICANT CHANGE UP (ref 40–120)
ALT FLD-CCNC: 91 U/L — HIGH (ref 12–78)
ANION GAP SERPL CALC-SCNC: 6 MMOL/L — SIGNIFICANT CHANGE UP (ref 5–17)
AST SERPL-CCNC: 87 U/L — HIGH (ref 15–37)
BILIRUB SERPL-MCNC: 2.1 MG/DL — HIGH (ref 0.2–1.2)
BUN SERPL-MCNC: 25 MG/DL — HIGH (ref 7–23)
CALCIUM SERPL-MCNC: 9.5 MG/DL — SIGNIFICANT CHANGE UP (ref 8.5–10.1)
CHLORIDE SERPL-SCNC: 106 MMOL/L — SIGNIFICANT CHANGE UP (ref 96–108)
CO2 SERPL-SCNC: 26 MMOL/L — SIGNIFICANT CHANGE UP (ref 22–31)
CREAT SERPL-MCNC: 1.24 MG/DL — SIGNIFICANT CHANGE UP (ref 0.5–1.3)
GLUCOSE SERPL-MCNC: 95 MG/DL — SIGNIFICANT CHANGE UP (ref 70–99)
LIDOCAIN IGE QN: 82 U/L — SIGNIFICANT CHANGE UP (ref 73–393)
POTASSIUM SERPL-MCNC: 4.2 MMOL/L — SIGNIFICANT CHANGE UP (ref 3.5–5.3)
POTASSIUM SERPL-SCNC: 4.2 MMOL/L — SIGNIFICANT CHANGE UP (ref 3.5–5.3)
PROT SERPL-MCNC: 7.7 GM/DL — SIGNIFICANT CHANGE UP (ref 6–8.3)
SODIUM SERPL-SCNC: 138 MMOL/L — SIGNIFICANT CHANGE UP (ref 135–145)

## 2020-02-19 PROCEDURE — 74177 CT ABD & PELVIS W/CONTRAST: CPT | Mod: 26

## 2020-02-19 RX ORDER — CYCLOBENZAPRINE HYDROCHLORIDE 10 MG/1
1 TABLET, FILM COATED ORAL
Qty: 15 | Refills: 0
Start: 2020-02-19 | End: 2020-02-23

## 2020-02-19 RX ORDER — LIDOCAINE 4 G/100G
2 CREAM TOPICAL ONCE
Refills: 0 | Status: COMPLETED | OUTPATIENT
Start: 2020-02-19 | End: 2020-02-19

## 2020-02-19 RX ADMIN — SODIUM CHLORIDE 1000 MILLILITER(S): 9 INJECTION INTRAMUSCULAR; INTRAVENOUS; SUBCUTANEOUS at 00:15

## 2020-02-19 RX ADMIN — LIDOCAINE 2 PATCH: 4 CREAM TOPICAL at 01:33

## 2020-02-19 RX ADMIN — Medication 30 MILLIGRAM(S): at 00:15

## 2020-02-19 NOTE — ED ADULT NURSE NOTE - OBJECTIVE STATEMENT
pt c/o b/l flank pain radiating to upper back. Denies NVD. pt denies flatus or belching. in no acute distress at this time. medicated and reports resolution of symptoms.

## 2020-02-19 NOTE — ED ADULT NURSE NOTE - CHPI ED NUR SYMPTOMS NEG
no blood in stool/no vomiting/no burning urination/no abdominal distension/no diarrhea/no hematuria/no fever/no nausea/no dysuria/no chills

## 2020-09-29 ENCOUNTER — APPOINTMENT (OUTPATIENT)
Dept: DERMATOLOGY | Facility: CLINIC | Age: 25
End: 2020-09-29
Payer: COMMERCIAL

## 2020-09-29 DIAGNOSIS — H01.132 ECZEMATOUS DERMATITIS OF RIGHT UPPER EYELID: ICD-10-CM

## 2020-09-29 DIAGNOSIS — H01.135 ECZEMATOUS DERMATITIS OF RIGHT UPPER EYELID: ICD-10-CM

## 2020-09-29 DIAGNOSIS — H01.134 ECZEMATOUS DERMATITIS OF RIGHT UPPER EYELID: ICD-10-CM

## 2020-09-29 DIAGNOSIS — H01.131 ECZEMATOUS DERMATITIS OF RIGHT UPPER EYELID: ICD-10-CM

## 2020-09-29 PROCEDURE — 99213 OFFICE O/P EST LOW 20 MIN: CPT | Mod: 95

## 2020-09-29 RX ORDER — ESCITALOPRAM OXALATE 5 MG/1
TABLET, FILM COATED ORAL
Refills: 0 | Status: ACTIVE | COMMUNITY

## 2020-09-29 RX ORDER — QUETIAPINE 100 MG/1
100 TABLET, FILM COATED ORAL
Refills: 0 | Status: ACTIVE | COMMUNITY

## 2020-09-29 RX ORDER — HYDROCORTISONE 25 MG/G
2.5 OINTMENT TOPICAL
Qty: 1 | Refills: 1 | Status: ACTIVE | COMMUNITY
Start: 2020-09-29 | End: 1900-01-01

## 2020-09-29 NOTE — PHYSICAL EXAM
[FreeTextEntry3] : Photos examined and patient seen via live interaction over the computer\par \par Eyelids:  erythema and scaling present on the upper eyelids, especially the right and below the lower eyelids medially, left >right

## 2020-09-29 NOTE — HISTORY OF PRESENT ILLNESS
[Home] : at home, [unfilled] , at the time of the visit. [Medical Office: ([facility])___] : at the medical office located in  [Verbal consent obtained from patient] : the patient, [unfilled] [FreeTextEntry1] : Burning rash on the eyelids [de-identified] : First visit by a telehealth 89-year-old white male with a one month history of progressive burning redness on the eyelids. Still treated with 1% hydrocortisone cream for short time without improvement. Has also used Vaseline which helps with the burning.\par Patient has apparent history of eczema in the antecubital fossae as a child\par \par Verbal consent obtained.\par \par This was a Telehealth encounter via [employer] in which two-way real-time audio and visual communication was utilized.  Risks and benefits of receiving Telehealth services has been discussed with the patient.   The patient has been given ample opportunity to discuss any questions regarding [facility] telehealth services.  All of the patient's questions have been answered to satisfaction.\par

## 2020-10-18 ENCOUNTER — EMERGENCY (EMERGENCY)
Facility: HOSPITAL | Age: 25
LOS: 0 days | Discharge: HOME | End: 2020-10-19
Attending: EMERGENCY MEDICINE
Payer: COMMERCIAL

## 2020-10-18 VITALS
HEIGHT: 63 IN | SYSTOLIC BLOOD PRESSURE: 113 MMHG | HEART RATE: 123 BPM | OXYGEN SATURATION: 95 % | TEMPERATURE: 99 F | DIASTOLIC BLOOD PRESSURE: 76 MMHG | RESPIRATION RATE: 18 BRPM

## 2020-10-18 DIAGNOSIS — F32.9 MAJOR DEPRESSIVE DISORDER, SINGLE EPISODE, UNSPECIFIED: ICD-10-CM

## 2020-10-18 DIAGNOSIS — R45.851 SUICIDAL IDEATIONS: ICD-10-CM

## 2020-10-18 DIAGNOSIS — Z88.8 ALLERGY STATUS TO OTHER DRUGS, MEDICAMENTS AND BIOLOGICAL SUBSTANCES: ICD-10-CM

## 2020-10-18 DIAGNOSIS — Z90.89 ACQUIRED ABSENCE OF OTHER ORGANS: Chronic | ICD-10-CM

## 2020-10-18 DIAGNOSIS — Z81.8 FAMILY HISTORY OF OTHER MENTAL AND BEHAVIORAL DISORDERS: ICD-10-CM

## 2020-10-18 DIAGNOSIS — R00.0 TACHYCARDIA, UNSPECIFIED: ICD-10-CM

## 2020-10-18 DIAGNOSIS — F13.10 SEDATIVE, HYPNOTIC OR ANXIOLYTIC ABUSE, UNCOMPLICATED: ICD-10-CM

## 2020-10-18 DIAGNOSIS — F90.9 ATTENTION-DEFICIT HYPERACTIVITY DISORDER, UNSPECIFIED TYPE: ICD-10-CM

## 2020-10-18 DIAGNOSIS — Z79.899 OTHER LONG TERM (CURRENT) DRUG THERAPY: ICD-10-CM

## 2020-10-18 DIAGNOSIS — Z20.828 CONTACT WITH AND (SUSPECTED) EXPOSURE TO OTHER VIRAL COMMUNICABLE DISEASES: ICD-10-CM

## 2020-10-18 DIAGNOSIS — F41.9 ANXIETY DISORDER, UNSPECIFIED: ICD-10-CM

## 2020-10-18 LAB
APPEARANCE UR: CLEAR — SIGNIFICANT CHANGE UP
BASOPHILS # BLD AUTO: 0.05 K/UL — SIGNIFICANT CHANGE UP (ref 0–0.2)
BASOPHILS NFR BLD AUTO: 0.7 % — SIGNIFICANT CHANGE UP (ref 0–2)
BILIRUB UR-MCNC: NEGATIVE — SIGNIFICANT CHANGE UP
COLOR SPEC: YELLOW — SIGNIFICANT CHANGE UP
DIFF PNL FLD: NEGATIVE — SIGNIFICANT CHANGE UP
EOSINOPHIL # BLD AUTO: 0.08 K/UL — SIGNIFICANT CHANGE UP (ref 0–0.5)
EOSINOPHIL NFR BLD AUTO: 1.1 % — SIGNIFICANT CHANGE UP (ref 0–6)
GLUCOSE UR QL: NEGATIVE MG/DL — SIGNIFICANT CHANGE UP
HCT VFR BLD CALC: 46 % — SIGNIFICANT CHANGE UP (ref 39–50)
HGB BLD-MCNC: 15.6 G/DL — SIGNIFICANT CHANGE UP (ref 13–17)
IMM GRANULOCYTES NFR BLD AUTO: 0.4 % — SIGNIFICANT CHANGE UP (ref 0–1.5)
KETONES UR-MCNC: ABNORMAL
LEUKOCYTE ESTERASE UR-ACNC: ABNORMAL
LYMPHOCYTES # BLD AUTO: 2.06 K/UL — SIGNIFICANT CHANGE UP (ref 1–3.3)
LYMPHOCYTES # BLD AUTO: 27.5 % — SIGNIFICANT CHANGE UP (ref 13–44)
MCHC RBC-ENTMCNC: 28.8 PG — SIGNIFICANT CHANGE UP (ref 27–34)
MCHC RBC-ENTMCNC: 33.9 GM/DL — SIGNIFICANT CHANGE UP (ref 32–36)
MCV RBC AUTO: 85 FL — SIGNIFICANT CHANGE UP (ref 80–100)
MONOCYTES # BLD AUTO: 0.63 K/UL — SIGNIFICANT CHANGE UP (ref 0–0.9)
MONOCYTES NFR BLD AUTO: 8.4 % — SIGNIFICANT CHANGE UP (ref 2–14)
NEUTROPHILS # BLD AUTO: 4.64 K/UL — SIGNIFICANT CHANGE UP (ref 1.8–7.4)
NEUTROPHILS NFR BLD AUTO: 61.9 % — SIGNIFICANT CHANGE UP (ref 43–77)
NITRITE UR-MCNC: NEGATIVE — SIGNIFICANT CHANGE UP
PCP SPEC-MCNC: SIGNIFICANT CHANGE UP
PH UR: 5 — SIGNIFICANT CHANGE UP (ref 5–8)
PLATELET # BLD AUTO: 214 K/UL — SIGNIFICANT CHANGE UP (ref 150–400)
PROT UR-MCNC: NEGATIVE MG/DL — SIGNIFICANT CHANGE UP
RBC # BLD: 5.41 M/UL — SIGNIFICANT CHANGE UP (ref 4.2–5.8)
RBC # FLD: 12.1 % — SIGNIFICANT CHANGE UP (ref 10.3–14.5)
SP GR SPEC: 1.02 — SIGNIFICANT CHANGE UP (ref 1.01–1.02)
UROBILINOGEN FLD QL: NEGATIVE MG/DL — SIGNIFICANT CHANGE UP
WBC # BLD: 7.49 K/UL — SIGNIFICANT CHANGE UP (ref 3.8–10.5)
WBC # FLD AUTO: 7.49 K/UL — SIGNIFICANT CHANGE UP (ref 3.8–10.5)

## 2020-10-18 PROCEDURE — 99284 EMERGENCY DEPT VISIT MOD MDM: CPT

## 2020-10-18 PROCEDURE — 80053 COMPREHEN METABOLIC PANEL: CPT

## 2020-10-18 PROCEDURE — 36415 COLL VENOUS BLD VENIPUNCTURE: CPT

## 2020-10-18 PROCEDURE — 99285 EMERGENCY DEPT VISIT HI MDM: CPT

## 2020-10-18 PROCEDURE — 93010 ELECTROCARDIOGRAM REPORT: CPT

## 2020-10-18 PROCEDURE — 80307 DRUG TEST PRSMV CHEM ANLYZR: CPT

## 2020-10-18 PROCEDURE — 85025 COMPLETE CBC W/AUTO DIFF WBC: CPT

## 2020-10-18 PROCEDURE — U0003: CPT

## 2020-10-18 PROCEDURE — 81001 URINALYSIS AUTO W/SCOPE: CPT

## 2020-10-18 PROCEDURE — 93005 ELECTROCARDIOGRAM TRACING: CPT

## 2020-10-18 NOTE — ED ADULT NURSE NOTE - OBJECTIVE STATEMENT
Pt presents to ED [facility] for suicidal thoughts. Pt said he sent his mom a text saying that he was going to the train tracks to kill himself, mom called PD. PD showed up at train station, pt states he ran from ,  brought pt in to be seen. Pt states he currently does not have any thoughts of harming himself. Pt said he has previously had thoughts of harming himself, but states he is "too much of a pussy to do anything. I think I just did this for attention." Pt denies SI/HI at this time. Pt says he takes unprescribed xanax and occasionally drinks alcohol.

## 2020-10-18 NOTE — ED PROVIDER NOTE - CLINICAL SUMMARY MEDICAL DECISION MAKING FREE TEXT BOX
Hx of depression; suicidal text to mom this evening BIB police.  Alcohol use and xanax use tonight.  Labs, EKG, urine, and telepsych consult planned.

## 2020-10-18 NOTE — ED PROVIDER NOTE - PROGRESS NOTE DETAILS
Collateral info:  Mom phone # [phone] Patient medically cleared and telepsych contacted for consult.  Spoke to Dr. Cleveland and gave signout. Psych recommending outpatient visit with patients existing therapist.  Also recommends AA. Will fax over resources.

## 2020-10-18 NOTE — ED PROVIDER NOTE - OBJECTIVE STATEMENT
Pt. is a 24 yo M with a hx of depression on lexapro and seroquel, hx of alcohol and xanax and opiate abuse presents BIB police after making suicidal statement via text to his mom.  Patient states police found him up the block from where he lives.  He texted his mom that he was going to jump in front of a train.  He states he did this for attention.  Denies SI or HI.  Drank beer last night and took xanax.  Denies prior suicidal attempts.  Denies auditory or visual hallucinations.  States he got in a minor verbal argument earlier with his mom. Pt. is a 24 yo M with a hx of depression on lexapro and seroquel, hx of alcohol and xanax and opiate abuse presents BIB police after making suicidal statement via text to his mom.  Patient states police found him up the block from where he lives.  He texted his mom that he was going to jump in front of a train.  He states he did this for attention.  Denies SI or HI.  Drank beer last night and took xanax.  Denies prior suicidal attempts.  Denies auditory or visual hallucinations.  States he got in a minor verbal argument earlier with his mom.  Does not have any physical complaints except for being annoyed that he is here right now.  Per police he was agitated and resisted coming to the hospital, but on arrival was calm and cooperative. Pt. is a 26 yo M with a hx of depression on lexapro and seroquel, hx of alcohol and xanax and opiate abuse presents BIB police after making suicidal statement via text to his mom.  Patient states police found him up the block from where he lives.  He texted his mom that he was going to jump in front of a train.  He states he did this for attention.  Denies SI or HI.  Drank beer last night and took xanax.  Denies prior suicidal attempts.  Denies auditory or visual hallucinations.  States he got in a minor verbal argument earlier with his mom.  Does not have any physical complaints except for being annoyed that he is here right now.  Per police he was agitated and resisted coming to the hospital, but on arrival was calm and cooperative.  Psychiatrist: [facility] in [place]

## 2020-10-18 NOTE — ED ADULT TRIAGE NOTE - CHIEF COMPLAINT QUOTE
BIBEMS from home, mother called PD after getting a text from patient admitting SI, intent to jump in front of train. PMH of depression/anxiety. Patient went to the train station and was chased down by police so arrives in cuffs however not under arrest. Patient on lexapro and seroquel currently. Patient also states that he binges on street xanax 2-3 times a month "because it feels good." Denies SI in triage. 1:1 in place.

## 2020-10-18 NOTE — ED PROVIDER NOTE - NSFOLLOWUPINSTRUCTIONS_ED_ALL_ED_FT
SEE RESOURCES PRINTED FOR YOU BY PSYCHIATRIST.  See your therapist.     Alcohol Use Disorder    WHAT YOU NEED TO KNOW:    Alcohol use disorder (AUD) is problem drinking. AUD includes alcohol abuse and alcohol dependency.     DISCHARGE INSTRUCTIONS:    Seek care immediately if:     Your heart is beating faster than usual.      You have hallucinations.      You cannot remember what happens while you are drinking.      You have seizures.    Contact your healthcare provider if:     You are anxious and have nausea.      Your hands are shaky and you are sweating heavily.      You have questions or concerns about your condition or care.    Follow up with your healthcare provider as directed: Do not try to stop drinking on your own. Your healthcare provider may need to help you withdraw from alcohol safely. He may need to admit you to the hospital. You may also need any of the following treatments:    Medicines to decrease your craving for alcohol      Support groups such as Alcoholics Anonymous       Therapy from a psychiatrist or psychologist       Admission to an inpatient facility for treatment for severe AUD    Interested in discussing options to reduce your alcohol or drug use?      [facility]: [phone]   [facility] Substance Abuse Services: [phone], option #2   Methadone Maintenance & Ambulatory Opiate Detox: [phone]  [facility]: [phone]  [facility]: [phone]  [facility]: [phone]    [facility]: [phone], option #2   [facility]: [phone]    [facility]: [phone]    [facility] Central Intake: [phone]  [facility] Chemical Dependency/Ancillary Withdrawal: [phone]  [facility] Methadone Maintenance: [phone]    [facility]: [phone]  [facility] Addiction Treatment Services: [phone]    [facility] HopeLine: 1-188-1-[facility] Office of Alcoholism and Substance Abuse Services (OASAS): https://www.oasas.[place].gov/providerdirectory/  [facility] for Addiction Services and Psychotherapy Interventions Research (CASPIR)  www.[facility].org     Interested in discussing options to reduce your tobacco use?    [facility] Tobacco Control:  [phone]  [facility] QUITLINE: 0-532-HY-QUITS ([phone])    Interested in learning more about substance use?      http://rethinkingdrinking.niaaa.nih.gov   https://www.drugabuse.gov/patients-families     Learn more about opioid overdose prevention programs in [facility]:  http://www.[facility].ny.gov/diseases/aids/general/opioid_overdose_prevention/    Depression    WHAT YOU NEED TO KNOW:    Depression is a medical condition that causes feelings of sadness or hopelessness that do not go away. Depression may cause you to lose interest in things you used to enjoy. These feelings may interfere with your daily life.    DISCHARGE INSTRUCTIONS:    Call your local emergency number (911 in the ) if:     You think about harming yourself or someone else.      You have done something on purpose to hurt yourself.    Call your therapist or doctor if:     Your symptoms do not improve.      You cannot make it to your next appointment.       You have new symptoms.      You have questions or concerns about your condition or care.    The following resources are available at any time to help you, if needed:     National Suicide Prevention Lifeline: 1-472.474.3600 (7-473-610-TALK)      Suicide Hotline: 1-823.556.4277 (9-385-NLMZUNF)      For a list of international numbers: https://save.org/find-help/international-resources/    Medicines:     Antidepressants may be given to improve or balance your mood. You may need to take this medicine for several weeks before you begin to feel better.      Take your medicine as directed. Contact your healthcare provider if you think your medicine is not helping or if you have side effects. Tell him of her if you are allergic to any medicine. Keep a list of the medicines, vitamins, and herbs you take. Include the amounts, and when and why you take them. Bring the list or the pill bottles to follow-up visits. Carry your medicine list with you in case of an emergency.    Therapy is often used together with medicine to relieve depression. Therapy is a way for you to talk about your feelings and anything that may be causing depression. Therapy can be done alone or in a group. It may also be done with family members or a significant other.    Self-care:     Get regular physical activity. Try to be active for 30 minutes, 3 to 5 days a week. Physical activity can help relieve depression. Work with your healthcare provider to develop a plan that you enjoy. It may help to ask someone to be active with you.      Create a regular sleep schedule. A routine can help you relax before bed. Listen to music, read, or do yoga. Try to go to bed and wake up at the same time every day. Sleep is important for emotional health.      Eat a variety of healthy foods. Healthy foods include fruits, vegetables, whole-grain breads, low-fat dairy products, lean meats, fish, and cooked beans. A healthy meal plan is low in fat, salt, and added sugar.      Do not drink alcohol or use drugs. Alcohol and drugs can make depression worse. Talk to your therapist or doctor if you need help quitting.    Follow up with your healthcare provider as directed: Your healthcare provider will monitor your progress at follow-up visits. He or she will also monitor your medicine if you take antidepressants. Your healthcare provider will ask if the medicine is helping. Tell him or her about any side effects or problems you may have with your medicine. The type or amount of medicine may need to be changed. Write down your questions so you remember to ask them during your visits.

## 2020-10-18 NOTE — ED PROVIDER NOTE - PATIENT PORTAL LINK FT
You can access the FollowMyHealth Patient Portal offered by [facility] by registering at the following website: [email or web address]. By joining [employer]’s FollowMyHealth portal, you will also be able to view your health information using other applications (apps) compatible with our system.

## 2020-10-19 VITALS — DIASTOLIC BLOOD PRESSURE: 57 MMHG | SYSTOLIC BLOOD PRESSURE: 109 MMHG

## 2020-10-19 DIAGNOSIS — F32.9 MAJOR DEPRESSIVE DISORDER, SINGLE EPISODE, UNSPECIFIED: ICD-10-CM

## 2020-10-19 DIAGNOSIS — F13.10 SEDATIVE, HYPNOTIC OR ANXIOLYTIC ABUSE, UNCOMPLICATED: ICD-10-CM

## 2020-10-19 LAB
ALBUMIN SERPL ELPH-MCNC: 4.3 G/DL — SIGNIFICANT CHANGE UP (ref 3.3–5)
ALP SERPL-CCNC: 56 U/L — SIGNIFICANT CHANGE UP (ref 40–120)
ALT FLD-CCNC: 53 U/L — SIGNIFICANT CHANGE UP (ref 12–78)
ANION GAP SERPL CALC-SCNC: 7 MMOL/L — SIGNIFICANT CHANGE UP (ref 5–17)
APAP SERPL-MCNC: < 2 UG/ML (ref 10–30)
AST SERPL-CCNC: 34 U/L — SIGNIFICANT CHANGE UP (ref 15–37)
BILIRUB SERPL-MCNC: 0.9 MG/DL — SIGNIFICANT CHANGE UP (ref 0.2–1.2)
BUN SERPL-MCNC: 13 MG/DL — SIGNIFICANT CHANGE UP (ref 7–23)
CALCIUM SERPL-MCNC: 9.2 MG/DL — SIGNIFICANT CHANGE UP (ref 8.5–10.1)
CHLORIDE SERPL-SCNC: 112 MMOL/L — HIGH (ref 96–108)
CO2 SERPL-SCNC: 26 MMOL/L — SIGNIFICANT CHANGE UP (ref 22–31)
CREAT SERPL-MCNC: 1.26 MG/DL — SIGNIFICANT CHANGE UP (ref 0.5–1.3)
ETHANOL SERPL-MCNC: <10 MG/DL — SIGNIFICANT CHANGE UP (ref 0–10)
GLUCOSE SERPL-MCNC: 117 MG/DL — HIGH (ref 70–99)
POTASSIUM SERPL-MCNC: 4.3 MMOL/L — SIGNIFICANT CHANGE UP (ref 3.5–5.3)
POTASSIUM SERPL-SCNC: 4.3 MMOL/L — SIGNIFICANT CHANGE UP (ref 3.5–5.3)
PROT SERPL-MCNC: 7.5 GM/DL — SIGNIFICANT CHANGE UP (ref 6–8.3)
SALICYLATES SERPL-MCNC: <1.7 MG/DL — LOW (ref 2.8–20)
SARS-COV-2 RNA SPEC QL NAA+PROBE: SIGNIFICANT CHANGE UP
SODIUM SERPL-SCNC: 145 MMOL/L — SIGNIFICANT CHANGE UP (ref 135–145)

## 2020-10-19 PROCEDURE — 90792 PSYCH DIAG EVAL W/MED SRVCS: CPT | Mod: 95

## 2020-10-19 NOTE — ED BEHAVIORAL HEALTH ASSESSMENT NOTE - VIOLENCE PROTECTIVE FACTORS:
Good treatment response/compliance/Residential stability/Engagement in treatment/Insight into violence risk and need for management/treatment/Employment stability

## 2020-10-19 NOTE — ED BEHAVIORAL HEALTH ASSESSMENT NOTE - SUICIDE PROTECTIVE FACTORS
Supportive social network of family or friends/Positive therapeutic relationships/Identifies reasons for living/Has future plans/Engaged in work or school/Responsibility to family and others

## 2020-10-19 NOTE — ED BEHAVIORAL HEALTH ASSESSMENT NOTE - RISK ASSESSMENT
Risk factors include substance abuse and hx of hospitalizations for suicidal ideation. Protective factors include no current SI/HI, denies hx of suicide attempts, future oriented, engaged in work, supportive social system, engaged in treatment, compliant with meds, no current mood/psychotic symptoms, no current anxiety/insomnia symptoms, no access to firearms, and able to engage in safety planning.   At this time pt is at low acute risk of harm but at elevated chronic risk which can be mitigated with substance abuse treatment. Low Acute Suicide Risk Chronic risk factors include substance abuse and hx of hospitalizations for suicidal ideation. He is able to engage in safety planning.   At this time pt is at low acute risk of harm but at elevated chronic risk which can be mitigated with substance abuse treatment and increased psychosocial/therapeutic support in community.

## 2020-10-19 NOTE — ED BEHAVIORAL HEALTH ASSESSMENT NOTE - DIFFERENTIAL
adjustment disorder with disturbance of conduct  xanax use disorder  r/o personality disorder depressive disorder unspecified  sedative/hypnotic use disorder

## 2020-10-19 NOTE — ED BEHAVIORAL HEALTH ASSESSMENT NOTE - OTHER
mother denies SI/HI or delusions chronically limited substance abuse denies SI/HI limited in setting of benzo/substance use external billing

## 2020-10-19 NOTE — ED BEHAVIORAL HEALTH ASSESSMENT NOTE - REFERRAL / APPOINTMENT DETAILS
contact psychiatrist as soon as possible for follow up; sending referrals for therapist and encouraged support group attendance ie AA, SMART, CRAFT

## 2020-10-19 NOTE — ED BEHAVIORAL HEALTH ASSESSMENT NOTE - SUMMARY
This is a 24YO  male, single, noncaregiver, domiciled with parents, employed with [employer], pphx depression, anxiety, and ADHD, outpatient treatment with Dr. Kailash Jack on Lexapro and Seroquel, hx since 8th grade of xanax abuse including arrest for substance possession and court mandated rehab treatment, no hx of w/d seizures/DTs, 2 prior inpt hospitalizations for making suicidal statements in the context of substance intox (most recently HH in 2/2019), no suicide attempts or NSSIB, no hx of violence, no past med hx, BIBEMS activated by mother after pt sent her concerning text messages that he would go to train tracks.    Currently does not appear acutely depressed, distressed, anxious, intoxicated, or otherwise decompensated/disorganized. He is engaged in interview, able to admit that he was seeking attention but not suicidal. Denies SI currently. Denies suicide attempts in past. Admits to chronic feelings of emptiness. Has been on lexapro/seroquel for several years under care of psychiatrist Dr. Jack. Declines voluntary admission at this time. He agrees to increased support through therapist or support group.     Collateral from mother is reassuring that there is not acute risk for imminent harm to self but feels he needs increased support in community including groups and therapy.     Discussed safety plan extensively. See safety plan in chart.

## 2020-10-19 NOTE — ED BEHAVIORAL HEALTH ASSESSMENT NOTE - SUICIDE RISK FACTORS
Cluster B Personality disorders or traits current/past/Mood Disorder current/past/Alcohol/Substance abuse disorders/Impulsivity

## 2020-10-19 NOTE — ED BEHAVIORAL HEALTH ASSESSMENT NOTE - DESCRIPTION (FIRST USE, LAST USE, QUANTITY, FREQUENCY, DURATION)
see HPI hx of abuse, xanax with arrests, treatment; buying on the street infrequent intermittent, infrequent this is his drug of choice; there is long hx of abuse, xanax with arrests, treatment; buying on the street

## 2020-10-19 NOTE — ED BEHAVIORAL HEALTH ASSESSMENT NOTE - FAMILY HISTORY OF SUBSTANCE ABUSE
Patient presents to triage c/o right shoulder and neck pain after a fall approximately 1 week ago. Vital signs WNL.  
None known

## 2020-10-19 NOTE — ED BEHAVIORAL HEALTH ASSESSMENT NOTE - DETAILS
see HPI rash with lamictal, "racing thoughts" on Vyvanse depression/anxiety in family; no known suicide attempt "a little bit drowsy" denies suicide attempts depression/anxiety in family; no known suicide attempts mother

## 2020-10-19 NOTE — ED BEHAVIORAL HEALTH NOTE - BEHAVIORAL HEALTH NOTE
===================  PRE-HOSPITAL COURSE  ===================  SOURCE:  SONNY Alcocer and triage documentation.     DETAILS:  BIBEMS/police activated by his mother after pt reported SI. Per EMS pt was not cooperative while in transport but then calmed down upon arriving to the hospital.     ============  ED COURSE   ============  SOURCE:  SONNY Alcocer and triage documentation.    ARRIVAL:  BIBEMS/police activated by his mother after pt reported SI.     BELONGINGS:  Pt’s belongings have been collected by security.     BEHAVIOR:  Per triage documentation pt reported that he has hx of alcohol, Xanax and opiate abuse. Pt reported that he texted his mother that he was going to jump in front of the train. Pt admitted to the ed attending that he did it for attention. Pt denied SI.HI.AVH and no delusions were elicited. Pt reported that he got into a minor verbal argument with mother which led to pt leaving the house and then texting her that he was going to hurt himself. Pt did not have anything to eat or drink. Pt was complaint with triage procedures and allowed for bloodwork to get drawn and for an EKG to get done. No signs of agitation and pt’s behavior was in control.     TREATMENT:  Pt did not require any medication or security intervention.     VISITORS:  Pt did not have any visitors at bedside.     ========================  COVID Exposure Screen- Patient  ========================    COVID Exposure Screen- collateral (i.e. third-party, chart review, belongings, etc; include EMS and ED staff)  1.        *In the past 14 days, has the patient been around anyone with a positive COVID-19 test?*   (  ) Yes   ( X ) No   (  ) Unknown- Reason (e.g. collateral uncertain, refusing to answer, etc.):  ______  IF YES PROCEED TO QUESTION #2. IF NO or UNKNOWN, PLEASE SKIP TO QUESTION #7  2.        Was the patient within 6 feet of them for at least 15 minutes? (  ) Yes   (  ) No   (  ) Unknown- Reason: ______    3.        Did the patient provide care for them? (  ) Yes   (  ) No   (  ) Unknown- Reason: ______    4.        Did the patient have direct physical contact with them (touched, hugged, or kissed them)? (  ) Yes   (  ) No    (  ) Unknown- Reason: ______    5.        Did the patient share eating or drinking utensils with them? (  ) Yes   (  ) No    (  ) Unknown- Reason: ______    6.        Have they sneezed, coughed, or somehow got respiratory droplets on the patient? (  ) Yes   (  ) No    (  ) Unknown- Reason: ______    7.        *Has the patient been out of [place] within the past 14 days?*  (  ) Yes   ( X ) No   (  ) Unknown- Reason (e.g. collateral uncertain, refusing to answer, etc.): _______  IF YES PLEASE ANSWER THE FOLLOWING QUESTIONS:  8.        Which state/country has the patient been to? ______   9.        Was the patient there over 24 hours? (  ) Yes   (  ) No    (  ) Unknown- Reason: ______    10.     What date did the patient return to [facility]? ______     ========================  FOR EACH COLLATERAL  ========================  NAME: Karen Bolden    NUMBER: [phone]    RELATIONSHIP:  Mother    RELIABILITY:  Reliable.     ========================  PATIENT DEMOGRAPHICS:  ========================  HPI    BASELINE FUNCTIONING:  Patient is a 25 year old male, domiciled with parents, employed for [employer], not in school, no pmhx, allergic to Lamictal medication, pphx of ADHD, Depression and Anxiety, sees a psychiatrist, Dr. Jack ([phone] [address] [place] [address]), pt’s current med list include Seroquel 25mg and Lexapro 30mg, last inpatient hospitalization was approx. 1 year ago in [place] due to drug use and pt voicing SI, no hx of detox/rehab admission, substance use includes off the street Xanax, sometimes marijuana, drinks alcohol and no cigarettes. Hx of CAD in the context of telling pt to kill himself but no hx of HI.VH. No delusions.  Pt’s mother reported that pt has been increasingly eating but then reports that he’s unable to stop. Pt reports that eating is a way for him to feel something but then pt will get upset because of how much weight he has gained and then he begins to self criticize himself.     DATE HPI STARTED: This year around Covid-19 March 2020.     DECOMPENSATION: Today pt drank 2 large beers and used Xanax prior to beginin to state that his life does not matter, he’s upset about being short and pt feels that no one loves him. Pt then became angry and reported that he wanted to punch his mother but pt did not physically assault anyone. However, pt continued to rapidly punch himself on the chin. Pt then walked out and went inside the car. Mother followed and had a conversation with him. Pt’s mother encourged pt to come back inside the house but then pt became irritable again and stated that he was going to really kill himself today by possibly jumping in front of the train. Pt’s mother followed him outside with the car and then she activated 911. [place] arrived on the scene and brought pt to [place].     SUICIDALITY: Pt voiced SI today in the context of possibly jumping in front of the train. SIB today in the context of punching himself in the chin. No recent SA.     VIOLENCE:  No recent violence.     SUBSTANCE: Pt drank alcohol today (2 large beers) and used Xanax.       ========================  PAST PSYCHIATRIC HISTORY  ========================  DATE PAST PSYCHIATRIC HISTORY STARTED: Unable to endorse.     MAIN PSYCHIATRIC DIAGNOSIS: ADHD, Depression and Anxiety.     PSYCHIATRIC HOSPITALIZATIONS: Pt’s las admission was approximately 1 year ago in the context of substance use and SI.     PRIOR ILLNESS: Pt does not want to go to detox or rehab treatment. Pt continues to stay in contact with Dr. Jack via telephone.      SUICIDALITY:  Hx of SI, pt has the tendency to state that he is going to hurt himself. Last year pt’s mother looked through his computer history and saw that pt googled ways to kill himself. SIB in the context of pt punching himself on the chin. No hx of SA.     VIOLENCE:  Pt has broken furniture and other objects in the house due to poor impulse control and infuriating outbursts.     SUBSTANCE USE: Pt continues to use Xanax off the street. Pt will use marijuana occasionally and pt has used suboxone once approx. 3 months ago. No other illicit drug use. Pt will drink alcohol occasionally and pt does not smoke cigarettes.     ==============  OTHER HISTORY  ==============  CURRENT MEDICATION:  Seroquel 25mg and Lexapro 30mg.     MEDICAL HISTORY:  No pmhx.     ALLERGIES: Pt is allergic to Lamictal.     LEGAL ISSUES: Pt has never been arrested.     FIREARM ACCESS: Pt does not have access to firearms/weapons.     SOCIAL HISTORY: No hx of  trauma or APS/ACS.     FAMILY HISTORY: None.     DEVELOPMENTAL HISTORY: None.     ========================  COVID Exposure Screen- Patient  ========================    1.        *In the past 14 days, have you been around anyone with a positive COVID-19 test?*   (  ) Yes   (  X) No   (  ) Unknown- Reason (e.g. patient uncertain, sedated, refusing to answer, etc.):  ______  IF YES PROCEED TO QUESTION #2. IF NO or UNKNOWN, PLEASE SKIP TO QUESTION #7  2.        Were you within 6 feet of them for at least 15 minutes? (  ) Yes   (  ) No   (  ) Unknown- Reason: ______    3.        Have you provided care for them? (  ) Yes   (  ) No   (  ) Unknown- Reason: ______    4.        Have you had direct physical contact with them (touched, hugged, or kissed them)? (  ) Yes   (  ) No    (  ) Unknown- Reason: ______    5.        Have you shared eating or drinking utensils with them? (  ) Yes   (  ) No    (  ) Unknown- Reason: ______    6.        Have they sneezed, coughed, or somehow got respiratory droplets on you? (  ) Yes   (  ) No    (  ) Unknown- Reason: ______    7.        *Have you been out of [place] within the past 14 days?*  (  ) Yes   ( X ) No   (  ) Unknown- Reason (e.g. patient uncertain, sedated, refusing to answer, etc.): _______  IF YES PLEASE ANSWER THE FOLLOWING QUESTIONS:  8.        Which state/country have you been to? ______   9.        Were you there over 24 hours? (  ) Yes   (  ) No    (  ) Unknown- Reason: ______    10.     What date did you return to [facility]? ______

## 2020-10-19 NOTE — ED BEHAVIORAL HEALTH NOTE - BEHAVIORAL HEALTH NOTE
===================  PRE-HOSPITAL COURSE  ===================  SOURCE:  SONNY Alcocer and triage documentation.     DETAILS:  BIBEMS/police activated by his mother after pt reported SI. Per EMS pt was not cooperative while in transport but then calmed down upon arriving to the hospital.     ============  ED COURSE   ============  SOURCE:  SONNY Alcocer and triage documentation.    ARRIVAL:  BIBEMS/police activated by his mother after pt reported SI.     BELONGINGS:  Pt’s belongings have been collected by security.     BEHAVIOR:  Per triage documentation pt reported that he has hx of alcohol, Xanax and opiate abuse. Pt reported that he texted his mother that he was going to jump in front of the train. Pt admitted to the ed attending that he did it for attention. Pt denied SI.HI.AVH and no delusions were elicited. Pt reported that he got into a minor verbal argument with mother which led to pt leaving the house and then texting her that he was going to hurt himself. Pt did not have anything to eat or drink. Pt was complaint with triage procedures and allowed for bloodwork to get drawn and for an EKG to get done. No signs of agitation and pt’s behavior was in control.     TREATMENT:  Pt did not require any medication or security intervention.     VISITORS:  Pt did not have any visitors at bedside.     ========================  COVID Exposure Screen- Patient  ========================    COVID Exposure Screen- collateral (i.e. third-party, chart review, belongings, etc; include EMS and ED staff)  1.        *In the past 14 days, has the patient been around anyone with a positive COVID-19 test?*   (  ) Yes   ( X ) No   (  ) Unknown- Reason (e.g. collateral uncertain, refusing to answer, etc.):  ______  IF YES PROCEED TO QUESTION #2. IF NO or UNKNOWN, PLEASE SKIP TO QUESTION #7  2.        Was the patient within 6 feet of them for at least 15 minutes? (  ) Yes   (  ) No   (  ) Unknown- Reason: ______    3.        Did the patient provide care for them? (  ) Yes   (  ) No   (  ) Unknown- Reason: ______    4.        Did the patient have direct physical contact with them (touched, hugged, or kissed them)? (  ) Yes   (  ) No    (  ) Unknown- Reason: ______    5.        Did the patient share eating or drinking utensils with them? (  ) Yes   (  ) No    (  ) Unknown- Reason: ______    6.        Have they sneezed, coughed, or somehow got respiratory droplets on the patient? (  ) Yes   (  ) No    (  ) Unknown- Reason: ______    7.        *Has the patient been out of [place] within the past 14 days?*  (  ) Yes   ( X ) No   (  ) Unknown- Reason (e.g. collateral uncertain, refusing to answer, etc.): _______  IF YES PLEASE ANSWER THE FOLLOWING QUESTIONS:  8.        Which state/country has the patient been to? ______   9.        Was the patient there over 24 hours? (  ) Yes   (  ) No    (  ) Unknown- Reason: ______    10.     What date did the patient return to [facility]? ______     ========================  FOR EACH COLLATERAL  ========================  NAME: Karen Bolden    NUMBER: [phone]    RELATIONSHIP:  Mother    RELIABILITY:  Reliable.     ========================  PATIENT DEMOGRAPHICS:  ========================  HPI    BASELINE FUNCTIONING:  Patient is a 25 year old male, domiciled with parents, employed for [employer], not in school, no pmhx, allergic to Lamictal medication, pphx of ADHD, Depression and Anxiety, sees a psychiatrist, Dr. Jack ([phone] [address] [place] [address]), pt’s current med list include Seroquel 25mg and Lexapro 30mg, last inpatient hospitalization was approx. 1 year ago in [place] due to drug use and pt voicing SI, no hx of detox/rehab admission, substance use includes off the street Xanax, sometimes marijuana, drinks alcohol and no cigarettes. Hx of CAD in the context of telling pt to kill himself but no hx of HI.VH. No delusions.  Pt’s mother reported that pt has been increasingly eating but then reports that he’s unable to stop. Pt reports that eating is a way for him to feel something but then pt will get upset because of how much weight he has gained and then he begins to self criticize himself.     DATE HPI STARTED: This year around Covid-19 March 2020.     DECOMPENSATION: Today pt drank 2 large beers and used Xanax prior to beginin to state that his life does not matter, he’s upset about being short and pt feels that no one loves him. Pt then became angry and reported that he wanted to punch his mother but pt did not physically assault anyone. However, pt continued to rapidly punch himself on the chin. Pt then walked out and went inside the car. Mother followed and had a conversation with him. Pt’s mother encourged pt to come back inside the house but then pt became irritable again and stated that he was going to really kill himself today by possibly jumping in front of the train. Pt’s mother followed him outside with the car and then she activated 911. [place] arrived on the scene and brought pt to [place].     SUICIDALITY: Pt voiced SI today in the context of possibly jumping in front of the train. SIB today in the context of punching himself in the chin. No recent SA.     VIOLENCE:  No recent violence.     SUBSTANCE: Pt drank alcohol today (2 large beers) and used Xanax.       ========================  PAST PSYCHIATRIC HISTORY  ========================  DATE PAST PSYCHIATRIC HISTORY STARTED: Unable to endorse.     MAIN PSYCHIATRIC DIAGNOSIS: ADHD, Depression and Anxiety.     PSYCHIATRIC HOSPITALIZATIONS: Pt’s las admission was approximately 1 year ago in the context of substance use and SI.     PRIOR ILLNESS: Pt does not want to go to detox or rehab treatment. Pt continues to stay in contact with Dr. Jack via telephone.      SUICIDALITY:  Hx of SI, pt has the tendency to state that he is going to hurt himself. Last year pt’s mother looked through his computer history and saw that pt googled ways to kill himself. SIB in the context of pt punching himself on the chin. No hx of SA.     VIOLENCE:  Pt has broken furniture and other objects in the house due to poor impulse control and infuriating outbursts.     SUBSTANCE USE: Pt continues to use Xanax off the street. Pt will use marijuana occasionally and pt has used suboxone once approx. 3 months ago. No other illicit drug use. Pt will drink alcohol occasionally and pt does not smoke cigarettes.     ==============  OTHER HISTORY  ==============  CURRENT MEDICATION:  Seroquel 25mg and Lexapro 30mg.     MEDICAL HISTORY:  No pmhx.     ALLERGIES: Pt is allergic to Lamictal.     LEGAL ISSUES: Pt has never been arrested.     FIREARM ACCESS: Pt does not have access to firearms/weapons.     SOCIAL HISTORY: No hx of  trauma or APS/ACS.     FAMILY HISTORY: None.     DEVELOPMENTAL HISTORY: None.     ========================  COVID Exposure Screen- Patient  ========================    1.        *In the past 14 days, have you been around anyone with a positive COVID-19 test?*   (  ) Yes   (  X) No   (  ) Unknown- Reason (e.g. patient uncertain, sedated, refusing to answer, etc.):  ______  IF YES PROCEED TO QUESTION #2. IF NO or UNKNOWN, PLEASE SKIP TO QUESTION #7  2.        Were you within 6 feet of them for at least 15 minutes? (  ) Yes   (  ) No   (  ) Unknown- Reason: ______    3.        Have you provided care for them? (  ) Yes   (  ) No   (  ) Unknown- Reason: ______    4.        Have you had direct physical contact with them (touched, hugged, or kissed them)? (  ) Yes   (  ) No    (  ) Unknown- Reason: ______    5.        Have you shared eating or drinking utensils with them? (  ) Yes   (  ) No    (  ) Unknown- Reason: ______    6.        Have they sneezed, coughed, or somehow got respiratory droplets on you? (  ) Yes   (  ) No    (  ) Unknown- Reason: ______    7.        *Have you been out of [place] within the past 14 days?*  (  ) Yes   ( X ) No   (  ) Unknown- Reason (e.g. patient uncertain, sedated, refusing to answer, etc.): _______  IF YES PLEASE ANSWER THE FOLLOWING QUESTIONS:  8.        Which state/country have you been to? ______   9.        Were you there over 24 hours? (  ) Yes   (  ) No    (  ) Unknown- Reason: ______    10.     What date did you return to [facility]? ______

## 2020-10-19 NOTE — ED BEHAVIORAL HEALTH ASSESSMENT NOTE - DESCRIPTION
see btcm note denies lives with parents; obtained associate's degree at [employer] lives with parents; obtained associate's degree at [employer]; has an older sister; works at [employer] as
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
## 2020-12-14 NOTE — ED BEHAVIORAL HEALTH ASSESSMENT NOTE - NS ED BHA MSE GENERAL APPEARANCE
Physical Therapy  Attempted to see pt at 1325, pt was being discharged home. Irene Cat.  Idalia Sahu PTA Well developed

## 2021-07-19 NOTE — DISCHARGE NOTE BEHAVIORAL HEALTH - SECONDARY DIAGNOSIS.
From: Courtney Bright  To: Araceli Chavira  Sent: 7/16/2021 8:27 AM CDT  Subject: Referral Request    To Dr KENIA Chavira    Good morning,  Update: per phone conversation with Ricki; yes please refer me to Dr Franklin for rectal bleeding daily since July 10th.  Thank you,  Courtney DOCKERY    Depression, unspecified depression type

## 2022-03-01 NOTE — ED PROVIDER NOTE - NSFOLLOWUPCLINICS_GEN_ALL_ED_FT
Message left for Low as to where he would like this to be sent  
Reason for Call:  Patient is calling to request an extra refill of    Continuous Blood Gluc Sensor (FREESTYLE ROSA 2 SENSOR) [facility]    WILL CALL BACK WITH PHARMACY INORMATION    Detailed comments: Patient is out of state until 3/7/22. Is concerned that he may get his current one wet, and would need a backup until he is back home.    Phone Number Patient can be reached at: Home number on file [phone] (home)    Best Time: any    Can we leave a detailed message on this number? YES    Call taken on 2/23/2022 at 12:56 PM by Montse Mayen    
A Therapist  Psychiatry  .  [place]   Phone:   Fax:   Follow Up Time:

## 2022-03-04 NOTE — DISCHARGE NOTE BEHAVIORAL HEALTH - NSBHDCPURPOSE2FT_PSY_A_CORE
Pt has an appt for psychotherapy/substance abuse counseling with therapist, Khalif Jimenes LCSW on Thursday, 2/28/2019 at 6:45PM. Please bring photo ID and your insurance card. Pt has an appt for psychotherapy/substance abuse counseling with therapist, Khalif Jimenes LCSW on Thursday, 2/28/2019 at 6:45PM. Please bring photo ID and your insurance card.  **You must call to confirm this appointment when you get home** [No Acute Distress] : no acute distress [Well Nourished] : well nourished [Well Developed] : well developed [Well-Appearing] : well-appearing [Normal Voice/Communication] : normal voice/communication [Normal Sclera/Conjunctiva] : normal sclera/conjunctiva [PERRL] : pupils equal round and reactive to light [EOMI] : extraocular movements intact [Normal Outer Ear/Nose] : the outer ears and nose were normal in appearance [Normal TMs] : both tympanic membranes were normal [No JVD] : no jugular venous distention [No Lymphadenopathy] : no lymphadenopathy [Thyroid Normal, No Nodules] : the thyroid was normal and there were no nodules present [No Respiratory Distress] : no respiratory distress  [No Accessory Muscle Use] : no accessory muscle use [Clear to Auscultation] : lungs were clear to auscultation bilaterally [Normal Rate] : normal rate  [Regular Rhythm] : with a regular rhythm [Normal S1, S2] : normal S1 and S2 [No Murmur] : no murmur heard [No Carotid Bruits] : no carotid bruits [No Abdominal Bruit] : a ~M bruit was not heard ~T in the abdomen [No Varicosities] : no varicosities [Pedal Pulses Present] : the pedal pulses are present [No Edema] : there was no peripheral edema [No Palpable Aorta] : no palpable aorta [No Extremity Clubbing/Cyanosis] : no extremity clubbing/cyanosis [Soft] : abdomen soft [Non Tender] : non-tender [Non-distended] : non-distended [No Masses] : no abdominal mass palpated [No HSM] : no HSM [Normal Bowel Sounds] : normal bowel sounds [Normal Posterior Cervical Nodes] : no posterior cervical lymphadenopathy [Normal Anterior Cervical Nodes] : no anterior cervical lymphadenopathy [No CVA Tenderness] : no CVA  tenderness [No Spinal Tenderness] : no spinal tenderness [No Rash] : no rash [Coordination Grossly Intact] : coordination grossly intact [No Focal Deficits] : no focal deficits [Normal Gait] : normal gait [Deep Tendon Reflexes (DTR)] : deep tendon reflexes were 2+ and symmetric [Speech Grossly Normal] : speech grossly normal [Memory Grossly Normal] : memory grossly normal [Normal Affect] : the affect was normal [Alert and Oriented x3] : oriented to person, place, and time [Normal Mood] : the mood was normal [Normal Insight/Judgement] : insight and judgment were intact [de-identified] : Right hand swollen index finger immobile

## 2022-05-10 NOTE — ED STATDOCS - CPE ED NEURO NORM
Addended by: FRANSICO GONZALES on: 5/10/2022 02:53 PM     Modules accepted: Orders    
Patient will pay for the Voltaren. One month at a time. Pharmacy informed.  
normal...

## 2023-02-03 NOTE — PROGRESS NOTE BEHAVIORAL HEALTH - NSBHADMITDANGERSELF_PSY_A_CORE
Reviewed medications for appropriate route of administration     MEDICATIONS  (STANDING):  chlorhexidine 0.12% Liquid 15 milliLiter(s) Oral Mucosa every 12 hours  chlorhexidine 4% Liquid 1 Application(s) Topical <User Schedule>  enoxaparin Injectable 40 milliGRAM(s) SubCutaneous <User Schedule>  famotidine    Tablet 20 milliGRAM(s) Oral two times a day  insulin lispro (ADMELOG) corrective regimen sliding scale   SubCutaneous every 6 hours  insulin NPH human recombinant 4 Unit(s) SubCutaneous every 6 hours  methylPREDNISolone sodium succinate Injectable 125 milliGRAM(s) IV Push daily  multiple electrolytes Injection Type 1 1000 milliLiter(s) (75 mL/Hr) IV Continuous <Continuous>  psyllium Powder 1 Packet(s) Oral three times a day  remdesivir  IVPB   IV Intermittent   remdesivir  IVPB 100 milliGRAM(s) IV Intermittent every 24 hours    MEDICATIONS  (PRN):  fentaNYL    Injectable 25 MICROGram(s) IV Push every 2 hours PRN pain / vent synchrony  sodium chloride 0.9% lock flush 10 milliLiter(s) IV Push every 1 hour PRN Pre/post blood products, medications, blood draw, and to maintain line patency    Jessica Ortez, PharmD  PGY2 Critical Care Pharmacy Resident  Available on [employer] Teams
Reviewed medications for appropriate route of administration. Blood glucose in the 200s, patient required total 10 units insulin lispro overnight since starting NPH insulin. Recommended  to increase insulin NPH regimen from 4 units Q6H to 6 units q6H.    MEDICATIONS  (STANDING):  chlorhexidine 0.12% Liquid 15 milliLiter(s) Oral Mucosa every 12 hours  chlorhexidine 4% Liquid 1 Application(s) Topical <User Schedule>  enoxaparin Injectable 40 milliGRAM(s) SubCutaneous daily  famotidine    Tablet 20 milliGRAM(s) Oral two times a day  insulin lispro (ADMELOG) corrective regimen sliding scale   SubCutaneous every 6 hours  insulin NPH human recombinant 4 Unit(s) SubCutaneous every 6 hours  methylPREDNISolone sodium succinate Injectable 125 milliGRAM(s) IV Push daily  multiple electrolytes Injection Type 1 1000 milliLiter(s) (75 mL/Hr) IV Continuous   psyllium Powder 1 Packet(s) Oral three times a day  remdesivir  IVPB   IV Intermittent   remdesivir  IVPB 100 milliGRAM(s) IV Intermittent every 24 hours    MEDICATIONS  (PRN):  fentaNYL    Injectable 25 MICROGram(s) IV Push every 2 hours PRN pain / vent synchrony  sodium chloride 0.9% lock flush 10 milliLiter(s) IV Push every 1 hour PRN Pre/post blood products, medications, blood draw, and to maintain line patency    Jessica Ortez, PharmD  PGY2 Critical Care Pharmacy Resident  Available on Microsoft Teams
suicidal ideation with plan and means
unable to care for self/suicidal ideation with plan and means
suicidal ideation with plan and means
suicidal ideation with plan and means
suicidal ideation with plan and means/unable to care for self

## 2023-03-24 ENCOUNTER — EMERGENCY (EMERGENCY)
Facility: HOSPITAL | Age: 28
LOS: 0 days | Discharge: HOME | End: 2023-03-24
Attending: EMERGENCY MEDICINE
Payer: COMMERCIAL

## 2023-03-24 VITALS — HEIGHT: 63 IN | WEIGHT: 160.06 LBS

## 2023-03-24 VITALS
RESPIRATION RATE: 18 BRPM | SYSTOLIC BLOOD PRESSURE: 119 MMHG | DIASTOLIC BLOOD PRESSURE: 53 MMHG | TEMPERATURE: 98 F | OXYGEN SATURATION: 100 % | HEART RATE: 83 BPM
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
## 2023-03-24 DIAGNOSIS — Z90.89 ACQUIRED ABSENCE OF OTHER ORGANS: Chronic | ICD-10-CM

## 2023-03-24 DIAGNOSIS — S61.210A LACERATION WITHOUT FOREIGN BODY OF RIGHT INDEX FINGER WITHOUT DAMAGE TO NAIL, INITIAL ENCOUNTER: ICD-10-CM
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
## 2023-03-24 DIAGNOSIS — F32.A DEPRESSION, UNSPECIFIED: ICD-10-CM

## 2023-03-24 DIAGNOSIS — Z90.49 ACQUIRED ABSENCE OF OTHER SPECIFIED PARTS OF DIGESTIVE TRACT: ICD-10-CM

## 2023-03-24 DIAGNOSIS — Z88.8 ALLERGY STATUS TO OTHER DRUGS, MEDICAMENTS AND BIOLOGICAL SUBSTANCES STATUS: ICD-10-CM

## 2023-03-24 DIAGNOSIS — Z23 ENCOUNTER FOR IMMUNIZATION: ICD-10-CM

## 2023-03-24 DIAGNOSIS — S62.610A DISPLACED FRACTURE OF PROXIMAL PHALANX OF RIGHT INDEX FINGER, INITIAL ENCOUNTER FOR CLOSED FRACTURE: ICD-10-CM

## 2023-03-24 PROCEDURE — 73130 X-RAY EXAM OF HAND: CPT | Mod: RT

## 2023-03-24 PROCEDURE — 26725 TREAT FINGER FRACTURE EACH: CPT | Mod: F6

## 2023-03-24 PROCEDURE — 96372 THER/PROPH/DIAG INJ SC/IM: CPT

## 2023-03-24 PROCEDURE — 73120 X-RAY EXAM OF HAND: CPT | Mod: RT

## 2023-03-24 PROCEDURE — 73130 X-RAY EXAM OF HAND: CPT | Mod: 26,RT,77

## 2023-03-24 PROCEDURE — 99285 EMERGENCY DEPT VISIT HI MDM: CPT | Mod: 25

## 2023-03-24 PROCEDURE — 90715 TDAP VACCINE 7 YRS/> IM: CPT

## 2023-03-24 PROCEDURE — 99284 EMERGENCY DEPT VISIT MOD MDM: CPT

## 2023-03-24 PROCEDURE — 90471 IMMUNIZATION ADMIN: CPT

## 2023-03-24 PROCEDURE — 73130 X-RAY EXAM OF HAND: CPT | Mod: 26,RT

## 2023-03-24 RX ORDER — TETANUS TOXOID, REDUCED DIPHTHERIA TOXOID AND ACELLULAR PERTUSSIS VACCINE, ADSORBED 5; 2.5; 8; 8; 2.5 [IU]/.5ML; [IU]/.5ML; UG/.5ML; UG/.5ML; UG/.5ML
0.5 SUSPENSION INTRAMUSCULAR ONCE
Refills: 0 | Status: COMPLETED | OUTPATIENT
Start: 2023-03-24 | End: 2023-03-24

## 2023-03-24 RX ORDER — CEPHALEXIN 500 MG
1 CAPSULE ORAL
Qty: 28 | Refills: 0
Start: 2023-03-24 | End: 2023-03-30

## 2023-03-24 RX ORDER — IBUPROFEN 200 MG
600 TABLET ORAL ONCE
Refills: 0 | Status: COMPLETED | OUTPATIENT
Start: 2023-03-24 | End: 2023-03-24

## 2023-03-24 RX ORDER — ACETAMINOPHEN 500 MG
650 TABLET ORAL ONCE
Refills: 0 | Status: COMPLETED | OUTPATIENT
Start: 2023-03-24 | End: 2023-03-24

## 2023-03-24 RX ORDER — CEFAZOLIN SODIUM 1 G
1000 VIAL (EA) INJECTION ONCE
Refills: 0 | Status: COMPLETED | OUTPATIENT
Start: 2023-03-24 | End: 2023-03-24

## 2023-03-24 RX ADMIN — Medication 1000 MILLIGRAM(S): at 17:28

## 2023-03-24 RX ADMIN — TETANUS TOXOID, REDUCED DIPHTHERIA TOXOID AND ACELLULAR PERTUSSIS VACCINE, ADSORBED 0.5 MILLILITER(S): 5; 2.5; 8; 8; 2.5 SUSPENSION INTRAMUSCULAR at 17:26

## 2023-03-24 RX ADMIN — Medication 600 MILLIGRAM(S): at 17:53

## 2023-03-24 RX ADMIN — Medication 650 MILLIGRAM(S): at 17:52

## 2023-03-24 NOTE — ED STATDOCS - PROGRESS NOTE DETAILS
pt seen and evaluated by ortho, splinted, lac repaired, would like pt to receive IM ancef x1, to be discharged home on keflex x1 week, f/u in office with Dr. Hernández, updated Tdap  Denita Schulte PA-C XR with +fx proximal 2nd phalanx, will consult ortho hand  Denita Schulte PA-C
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
## 2023-03-24 NOTE — ED STATDOCS - NSICDXFAMILYHX_GEN_ALL_CORE_FT
FAMILY HISTORY:  Mother  Still living? Unknown  Family history of depression, Age at diagnosis: Age Unknown

## 2023-03-24 NOTE — ED STATDOCS - PATIENT PORTAL LINK FT
You can access the FollowMyHealth Patient Portal offered by [facility] by registering at the following website: [email or web address]. By joining [employer]’s FollowMyHealth portal, you will also be able to view your health information using other applications (apps) compatible with our system.

## 2023-03-24 NOTE — ED STATDOCS - NS ED ATTENDING STATEMENT MOD
This was a shared visit with the ORLIN. I reviewed and verified the documentation and independently performed the documented:

## 2023-03-24 NOTE — ED STATDOCS - CLINICAL SUMMARY MEDICAL DECISION MAKING FREE TEXT BOX
Pt denies HA n/v, no need for CT head, will do XR of hand, lac repair, and discuss w/ hand if needed.
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
## 2023-03-24 NOTE — ED STATDOCS - NS ED ROS FT
Constitutional: No fever or chills  Eyes: No visual changes  HEENT: No throat pain  CV: No chest pain  Resp: No SOB no cough  GI: No abd pain, nausea or vomiting  : No dysuria  MSK: No musculoskeletal pain  Skin: +right 1st digit swelling/pain and laceration.   Neuro: No headache

## 2023-03-24 NOTE — ED STATDOCS - PHYSICAL EXAMINATION
Constitutional: NAD AOx3  Eyes: PERRL EOMI  Head: Normocephalic atraumatic  Mouth: MMM  Cardiac: regular rate and rhythm  Resp: Lungs CTAB  GI: Abd s/nd/nt  Neuro: CN2-12 grossly intact, JHAVERI x 4  Skin: Right hand from the 2nd MCP to the PIP is ecchymotic edematous and TTP w/ 5mm laceration on the top of his pointer finger b/w MCP and PIP. Abrasion to the middle of the forehead.   MSK: C-spine nontender.
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
## 2023-08-21 ENCOUNTER — EMERGENCY (EMERGENCY)
Facility: HOSPITAL | Age: 28
LOS: 0 days | Discharge: HOME | End: 2023-08-22
Attending: EMERGENCY MEDICINE
Payer: COMMERCIAL

## 2023-08-21 VITALS
WEIGHT: 160.06 LBS | TEMPERATURE: 99 F | RESPIRATION RATE: 16 BRPM | HEIGHT: 63 IN | HEART RATE: 98 BPM | DIASTOLIC BLOOD PRESSURE: 77 MMHG | OXYGEN SATURATION: 99 % | SYSTOLIC BLOOD PRESSURE: 122 MMHG

## 2023-08-21 DIAGNOSIS — M54.50 LOW BACK PAIN, UNSPECIFIED: ICD-10-CM

## 2023-08-21 DIAGNOSIS — Z88.8 ALLERGY STATUS TO OTHER DRUGS, MEDICAMENTS AND BIOLOGICAL SUBSTANCES: ICD-10-CM
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
## 2023-08-21 DIAGNOSIS — Z90.89 ACQUIRED ABSENCE OF OTHER ORGANS: Chronic | ICD-10-CM

## 2023-08-21 DIAGNOSIS — Z90.89 ACQUIRED ABSENCE OF OTHER ORGANS: ICD-10-CM
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
## 2023-08-21 DIAGNOSIS — F32.A DEPRESSION, UNSPECIFIED: ICD-10-CM

## 2023-08-21 PROCEDURE — 99285 EMERGENCY DEPT VISIT HI MDM: CPT

## 2023-08-21 PROCEDURE — 72131 CT LUMBAR SPINE W/O DYE: CPT | Mod: MG

## 2023-08-21 PROCEDURE — 99284 EMERGENCY DEPT VISIT MOD MDM: CPT | Mod: 25

## 2023-08-21 PROCEDURE — 96374 THER/PROPH/DIAG INJ IV PUSH: CPT

## 2023-08-21 PROCEDURE — 96375 TX/PRO/DX INJ NEW DRUG ADDON: CPT

## 2023-08-21 PROCEDURE — G1004: CPT

## 2023-08-21 RX ORDER — CYCLOBENZAPRINE HYDROCHLORIDE 10 MG/1
1 TABLET, FILM COATED ORAL
Qty: 14 | Refills: 0
Start: 2023-08-21 | End: 2023-08-27

## 2023-08-21 RX ORDER — KETOROLAC TROMETHAMINE 30 MG/ML
30 SYRINGE (ML) INJECTION ONCE
Refills: 0 | Status: DISCONTINUED | OUTPATIENT
Start: 2023-08-21 | End: 2023-08-21

## 2023-08-21 RX ORDER — DIPHENHYDRAMINE HCL 50 MG
25 CAPSULE ORAL ONCE
Refills: 0 | Status: COMPLETED | OUTPATIENT
Start: 2023-08-21 | End: 2023-08-21

## 2023-08-21 RX ORDER — MORPHINE SULFATE 50 MG/1
4 CAPSULE, EXTENDED RELEASE ORAL ONCE
Refills: 0 | Status: DISCONTINUED | OUTPATIENT
Start: 2023-08-21 | End: 2023-08-21

## 2023-08-21 RX ORDER — OXYCODONE AND ACETAMINOPHEN 5; 325 MG/1; MG/1
1 TABLET ORAL
Qty: 8 | Refills: 0
Start: 2023-08-21

## 2023-08-21 RX ORDER — CYCLOBENZAPRINE HYDROCHLORIDE 10 MG/1
10 TABLET, FILM COATED ORAL ONCE
Refills: 0 | Status: COMPLETED | OUTPATIENT
Start: 2023-08-21 | End: 2023-08-21

## 2023-08-21 RX ORDER — IBUPROFEN 200 MG
1 TABLET ORAL
Qty: 12 | Refills: 0
Start: 2023-08-21

## 2023-08-21 RX ORDER — SODIUM CHLORIDE 9 MG/ML
1000 INJECTION INTRAMUSCULAR; INTRAVENOUS; SUBCUTANEOUS ONCE
Refills: 0 | Status: COMPLETED | OUTPATIENT
Start: 2023-08-21 | End: 2023-08-21

## 2023-08-21 RX ADMIN — MORPHINE SULFATE 4 MILLIGRAM(S): 50 CAPSULE, EXTENDED RELEASE ORAL at 23:34

## 2023-08-21 RX ADMIN — Medication 25 MILLIGRAM(S): at 23:40

## 2023-08-21 RX ADMIN — SODIUM CHLORIDE 1000 MILLILITER(S): 9 INJECTION INTRAMUSCULAR; INTRAVENOUS; SUBCUTANEOUS at 23:31

## 2023-08-21 RX ADMIN — MORPHINE SULFATE 4 MILLIGRAM(S): 50 CAPSULE, EXTENDED RELEASE ORAL at 23:49

## 2023-08-21 RX ADMIN — CYCLOBENZAPRINE HYDROCHLORIDE 10 MILLIGRAM(S): 10 TABLET, FILM COATED ORAL at 23:31

## 2023-08-21 RX ADMIN — Medication 30 MILLIGRAM(S): at 23:33

## 2023-08-21 RX ADMIN — Medication 30 MILLIGRAM(S): at 23:49

## 2023-08-21 NOTE — ED PROVIDER NOTE - CLINICAL SUMMARY MEDICAL DECISION MAKING FREE TEXT BOX
Young male with lower back pain.  Patient states he is unable to walk, will give iv pain meds and reassess.  No concern for fx, more likely muscle strain from sneezing.

## 2023-08-21 NOTE — ED PROVIDER NOTE - CARE PROVIDER_API CALL
Mitchel Drew [facility]  Spine Surgery  [address], [address]  [place] [address]  Phone: [phone]  Fax: [phone]  Follow Up Time: 4-6 Days

## 2023-08-21 NOTE — ED PROVIDER NOTE - PATIENT PORTAL LINK FT
You can access the FollowMyHealth Patient Portal offered by [facility] by registering at the following website: [email or web address]. By joining [employer]’s FollowMyHealth portal, you will also be able to view your health information using other applications (apps) compatible with our system. You can access the FollowMyHealth Patient Portal offered by [facility] by registering at the following website: [email or web address]. By joining [employer]’s FollowMyHealth portal, you will also be able to view your health information using other applications (apps) compatible with our system.

## 2023-08-21 NOTE — ED ADULT TRIAGE NOTE - CHIEF COMPLAINT QUOTE
Pt BIBEMS from home c/o lower back  pain beginning at 7:30pm s/p sneezing. Pt states sitting relieves the pain. No PMHx. Denies trauma to the area.

## 2023-08-21 NOTE — ED ADULT NURSE REASSESSMENT NOTE - NS ED NURSE REASSESS COMMENT FT1
pt was medicated per order, upon administration of morphine, pt c/o of itching to left upper chest and above IV site to left AC, writer observed redness to left upper chest and above IV site, denies any SOB/throat itching/SOB, provider called to patient to assess, benadryl administered.

## 2023-08-21 NOTE — ED PROVIDER NOTE - PROGRESS NOTE DETAILS
29 yo m with back pain s/p sneezing. Patient unable to ambulate due to pain, will send to main pt feeling btter, mds sent, will dc    ED evaluation and management discussed with the patient and family (if available) in detail.  Close PMD follow up encouraged.  Strict ED return instructions discussed in detail and patient given the opportunity to ask any questions about their discharge diagnosis and instructions. Patient verbalized understanding.  pt ambulatory pt still unable to walk, will s/o to main doc

## 2023-08-21 NOTE — ED ADULT NURSE NOTE - OBJECTIVE STATEMENT
Pt is 27 y/o M with c/o of severe back pain after sneezing, pt stated "I feel like I slipped a disc." Pt denies any numbness/tingling to extremities, evaluated by provider, pt able to transfer from w/c to recliner, medicated per order.

## 2023-08-21 NOTE — ED PROVIDER NOTE - PHYSICAL EXAMINATION
Constitutional: young m sitting in chair in no acute istress  Eyes: PERRLA  Head: Normocephalic   Mouth: MMM  Cardiac: regular rate   Resp: Lungs CTAB  GI: Abd s/nt/nd, no rebound or guarding.  Neuro: awake, alert, moving all extremities  Skin: No rashes  msk: ttp lower back midline. no deformities or step offs
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
## 2023-08-22 VITALS
TEMPERATURE: 97 F | HEART RATE: 63 BPM | OXYGEN SATURATION: 99 % | RESPIRATION RATE: 19 BRPM | DIASTOLIC BLOOD PRESSURE: 60 MMHG | SYSTOLIC BLOOD PRESSURE: 109 MMHG

## 2023-08-22 PROCEDURE — G1004: CPT

## 2023-08-22 PROCEDURE — 72131 CT LUMBAR SPINE W/O DYE: CPT | Mod: 26,MG

## 2023-08-22 RX ORDER — LIDOCAINE 4 G/100G
1 CREAM TOPICAL ONCE
Refills: 0 | Status: COMPLETED | OUTPATIENT
Start: 2023-08-22 | End: 2023-08-22

## 2023-08-22 RX ORDER — IBUPROFEN 200 MG
1 TABLET ORAL
Qty: 15 | Refills: 0
Start: 2023-08-22 | End: 2023-08-26

## 2023-08-22 RX ORDER — ACETAMINOPHEN 500 MG
1000 TABLET ORAL ONCE
Refills: 0 | Status: COMPLETED | OUTPATIENT
Start: 2023-08-22 | End: 2023-08-22

## 2023-08-22 RX ADMIN — LIDOCAINE 1 PATCH: 4 CREAM TOPICAL at 00:22

## 2023-08-22 RX ADMIN — Medication 400 MILLIGRAM(S): at 01:40

## 2023-09-25 NOTE — DISCHARGE NOTE BEHAVIORAL HEALTH - NSBHDCADMRISK_PSY_A_CORE
Is clinically stable so we will continue with current medications and lab work to confirm status ordered.    no

## 2024-01-11 NOTE — PROGRESS NOTE BEHAVIORAL HEALTH - NSBHCONSORIP_PSY_A_CORE
Inpatient Admission...
17-Oct-2023

## 2024-01-29 NOTE — ED PROVIDER NOTE - NS_EDPROVIDERDISPOUSERTYPE_ED_A_ED
Had email an EFREM to his email for him to fill out   
Attending Attestation (For Attendings USE Only)...

## 2024-05-22 NOTE — ED BEHAVIORAL HEALTH ASSESSMENT NOTE - ORIENTED TO SITUATION
Chief Complaint   Patient presents with    Annual Exam     Needs refills on birth control       \"Have you been to the ER, urgent care clinic since your last visit?  Hospitalized since your last visit?\"    NO    “Have you seen or consulted any other health care providers outside of [facility] since your last visit?”    NO            Click Here for Release of Records Request     Yes

## 2024-10-07 NOTE — H&P ADULT - HEMATOLOGY/LYMPHATICS
negative
44-year-old male pmhx of hypertension, diverticulitis comes to ED w/ left lower quadrant/left upper quadrant abdominal pain. Started randomly over the past day.  Due to persistent symptoms patient decided to come to emergency department. Their pain/symptom is moderate, constant, non mediating with rest. Otherwise ROS negative.    General: non-toxic, NAD   HEENT: NCAT, PERRL   Cardiac: RRR, no murmurs, 2+ peripheral pulses   Chest: CTAB  Abdomen: Left upper quadrant, left lower quadrant tenderness palpation.  No rebound guarding.  Soft, non-distended, bowel sounds present.  Extremities: no peripheral edema, calf tenderness, or leg size discrepancies   Skin: no rashes   Neuro: AAOx4, 5+motor, sensory grossly intact   Psych: mood and affect appropriate    Impression: 44-year-old male pmhx of hypertension, diverticulitis comes to ED w/ left lower quadrant/left upper quadrant abdominal pain. Their symptoms and exam findings are concerning for gastritis, kidney stone, UTI, diverticulitis.    Ordered labs, imaging, medications for diagnosis, management, and treatment.